# Patient Record
Sex: FEMALE | Race: WHITE | NOT HISPANIC OR LATINO
[De-identification: names, ages, dates, MRNs, and addresses within clinical notes are randomized per-mention and may not be internally consistent; named-entity substitution may affect disease eponyms.]

---

## 2017-04-11 ENCOUNTER — APPOINTMENT (OUTPATIENT)
Dept: RADIOLOGY | Facility: CLINIC | Age: 62
End: 2017-04-11

## 2017-04-11 ENCOUNTER — OUTPATIENT (OUTPATIENT)
Dept: OUTPATIENT SERVICES | Facility: HOSPITAL | Age: 62
LOS: 1 days | End: 2017-04-11
Payer: COMMERCIAL

## 2017-04-11 ENCOUNTER — APPOINTMENT (OUTPATIENT)
Dept: MRI IMAGING | Facility: CLINIC | Age: 62
End: 2017-04-11

## 2017-04-11 DIAGNOSIS — Z00.8 ENCOUNTER FOR OTHER GENERAL EXAMINATION: ICD-10-CM

## 2017-04-11 PROCEDURE — 20605 DRAIN/INJ JOINT/BURSA W/O US: CPT

## 2017-04-11 PROCEDURE — 23350 INJECTION FOR SHOULDER X-RAY: CPT

## 2017-04-11 PROCEDURE — 73222 MRI JOINT UPR EXTREM W/DYE: CPT

## 2017-04-11 PROCEDURE — 77002 NEEDLE LOCALIZATION BY XRAY: CPT

## 2017-08-16 ENCOUNTER — APPOINTMENT (OUTPATIENT)
Dept: PULMONOLOGY | Facility: CLINIC | Age: 62
End: 2017-08-16
Payer: COMMERCIAL

## 2017-08-16 ENCOUNTER — FORM ENCOUNTER (OUTPATIENT)
Age: 62
End: 2017-08-16

## 2017-08-16 VITALS
BODY MASS INDEX: 26.63 KG/M2 | WEIGHT: 156 LBS | DIASTOLIC BLOOD PRESSURE: 70 MMHG | HEART RATE: 102 BPM | SYSTOLIC BLOOD PRESSURE: 110 MMHG | OXYGEN SATURATION: 97 % | HEIGHT: 64 IN

## 2017-08-16 VITALS — WEIGHT: 156 LBS | BODY MASS INDEX: 26.78 KG/M2

## 2017-08-16 PROCEDURE — 99215 OFFICE O/P EST HI 40 MIN: CPT | Mod: 25

## 2017-08-16 PROCEDURE — 94060 EVALUATION OF WHEEZING: CPT

## 2017-08-16 PROCEDURE — 94664 DEMO&/EVAL PT USE INHALER: CPT | Mod: 59

## 2017-08-16 RX ORDER — FLUTICASONE FUROATE AND VILANTEROL TRIFENATATE 100; 25 UG/1; UG/1
100-25 POWDER RESPIRATORY (INHALATION) DAILY
Qty: 1 | Refills: 5 | Status: DISCONTINUED | COMMUNITY
Start: 2017-08-16 | End: 2017-08-16

## 2017-08-17 ENCOUNTER — APPOINTMENT (OUTPATIENT)
Dept: CT IMAGING | Facility: CLINIC | Age: 62
End: 2017-08-17
Payer: COMMERCIAL

## 2017-08-17 ENCOUNTER — MEDICATION RENEWAL (OUTPATIENT)
Age: 62
End: 2017-08-17

## 2017-08-17 ENCOUNTER — OUTPATIENT (OUTPATIENT)
Dept: OUTPATIENT SERVICES | Facility: HOSPITAL | Age: 62
LOS: 1 days | End: 2017-08-17
Payer: COMMERCIAL

## 2017-08-17 DIAGNOSIS — J84.9 INTERSTITIAL PULMONARY DISEASE, UNSPECIFIED: ICD-10-CM

## 2017-08-17 DIAGNOSIS — R91.8 OTHER NONSPECIFIC ABNORMAL FINDING OF LUNG FIELD: ICD-10-CM

## 2017-08-17 PROCEDURE — 71250 CT THORAX DX C-: CPT

## 2017-08-17 PROCEDURE — 71250 CT THORAX DX C-: CPT | Mod: 26

## 2017-09-27 ENCOUNTER — APPOINTMENT (OUTPATIENT)
Dept: PULMONOLOGY | Facility: CLINIC | Age: 62
End: 2017-09-27
Payer: COMMERCIAL

## 2017-09-27 VITALS — DIASTOLIC BLOOD PRESSURE: 72 MMHG | SYSTOLIC BLOOD PRESSURE: 118 MMHG | OXYGEN SATURATION: 98 % | HEART RATE: 78 BPM

## 2017-09-27 DIAGNOSIS — Z87.891 PERSONAL HISTORY OF NICOTINE DEPENDENCE: ICD-10-CM

## 2017-09-27 PROCEDURE — 94010 BREATHING CAPACITY TEST: CPT

## 2017-09-27 PROCEDURE — 99215 OFFICE O/P EST HI 40 MIN: CPT | Mod: 25

## 2017-09-27 RX ORDER — PREDNISONE 10 MG/1
10 TABLET ORAL
Qty: 42 | Refills: 0 | Status: DISCONTINUED | COMMUNITY
Start: 2017-08-16 | End: 2017-09-27

## 2017-09-27 RX ORDER — DOXYCYCLINE HYCLATE 100 MG/1
100 TABLET ORAL TWICE DAILY
Qty: 20 | Refills: 0 | Status: DISCONTINUED | COMMUNITY
Start: 2017-08-16 | End: 2017-09-27

## 2017-11-23 ENCOUNTER — FORM ENCOUNTER (OUTPATIENT)
Age: 62
End: 2017-11-23

## 2017-11-24 ENCOUNTER — APPOINTMENT (OUTPATIENT)
Dept: CT IMAGING | Facility: CLINIC | Age: 62
End: 2017-11-24
Payer: COMMERCIAL

## 2017-11-24 ENCOUNTER — OUTPATIENT (OUTPATIENT)
Dept: OUTPATIENT SERVICES | Facility: HOSPITAL | Age: 62
LOS: 1 days | End: 2017-11-24
Payer: COMMERCIAL

## 2017-11-24 DIAGNOSIS — R93.8 ABNORMAL FINDINGS ON DIAGNOSTIC IMAGING OF OTHER SPECIFIED BODY STRUCTURES: ICD-10-CM

## 2017-11-24 PROCEDURE — 71250 CT THORAX DX C-: CPT

## 2017-11-24 PROCEDURE — 71250 CT THORAX DX C-: CPT | Mod: 26

## 2019-03-20 ENCOUNTER — TRANSCRIPTION ENCOUNTER (OUTPATIENT)
Age: 64
End: 2019-03-20

## 2020-04-29 ENCOUNTER — APPOINTMENT (OUTPATIENT)
Dept: CT IMAGING | Facility: CLINIC | Age: 65
End: 2020-04-29
Payer: COMMERCIAL

## 2020-04-29 ENCOUNTER — OUTPATIENT (OUTPATIENT)
Dept: OUTPATIENT SERVICES | Facility: HOSPITAL | Age: 65
LOS: 1 days | End: 2020-04-29
Payer: COMMERCIAL

## 2020-04-29 DIAGNOSIS — J44.9 CHRONIC OBSTRUCTIVE PULMONARY DISEASE, UNSPECIFIED: ICD-10-CM

## 2020-04-29 DIAGNOSIS — Z00.00 ENCOUNTER FOR GENERAL ADULT MEDICAL EXAMINATION WITHOUT ABNORMAL FINDINGS: ICD-10-CM

## 2020-04-29 PROCEDURE — 71250 CT THORAX DX C-: CPT | Mod: 26

## 2020-04-29 PROCEDURE — 71250 CT THORAX DX C-: CPT

## 2020-05-05 ENCOUNTER — APPOINTMENT (OUTPATIENT)
Dept: PULMONOLOGY | Facility: CLINIC | Age: 65
End: 2020-05-05
Payer: COMMERCIAL

## 2020-05-05 VITALS — WEIGHT: 150 LBS | DIASTOLIC BLOOD PRESSURE: 75 MMHG | SYSTOLIC BLOOD PRESSURE: 140 MMHG | BODY MASS INDEX: 25.75 KG/M2

## 2020-05-05 VITALS — OXYGEN SATURATION: 94 % | HEART RATE: 105 BPM

## 2020-05-05 DIAGNOSIS — J18.9 PNEUMONIA, UNSPECIFIED ORGANISM: ICD-10-CM

## 2020-05-05 DIAGNOSIS — J84.9 INTERSTITIAL PULMONARY DISEASE, UNSPECIFIED: ICD-10-CM

## 2020-05-05 PROCEDURE — 99215 OFFICE O/P EST HI 40 MIN: CPT

## 2020-05-05 RX ORDER — BUDESONIDE AND FORMOTEROL FUMARATE DIHYDRATE 160; 4.5 UG/1; UG/1
160-4.5 AEROSOL RESPIRATORY (INHALATION) TWICE DAILY
Qty: 3 | Refills: 3 | Status: DISCONTINUED | COMMUNITY
Start: 2017-08-17 | End: 2020-05-05

## 2020-05-05 NOTE — CONSULT LETTER
[Dear  ___] : Dear  [unfilled], [Please see my note below.] : Please see my note below. [Consult Letter:] : I had the pleasure of evaluating your patient, [unfilled]. [Sincerely,] : Sincerely, [FreeTextEntry3] : Les Schumacher MD FCCP\par Pulmonary/Critical Care/Sleep Medicine\par Department of Internal Medicine\par \par [facility]\par

## 2020-05-05 NOTE — HISTORY OF PRESENT ILLNESS
[TextBox_4] : 65-year-old female with a history of stage II chronic obstructive lung disease, poorly compliant with her drug regimen, seen today for a three-week illness, which included generalized weakness, as well as mild shortness of breath without associated cough, wheezes sputum production. She denies any fevers, chills, chest pains. She does suffer from chronic claudication and has had several episodes of ankle edema with spontaneous resolution. She followup with you and reports a negative EKG and unchanged  Spirometry, and was able to show me on her phone. Normal CBC and CMP.\par \par There has been some improvement in symptoms

## 2020-05-05 NOTE — PHYSICAL EXAM
[No Acute Distress] : no acute distress [Normal Oropharynx] : normal oropharynx [No Neck Mass] : no neck mass [Normal Appearance] : normal appearance [Normal Rate/Rhythm] : normal rate/rhythm [No Murmurs] : no murmurs [No Resp Distress] : no resp distress [Normal S1, S2] : normal s1, s2 [Clear to Auscultation Bilaterally] : clear to auscultation bilaterally [Benign] : benign [No Abnormalities] : no abnormalities [No Clubbing] : no clubbing [Normal Gait] : normal gait [No Cyanosis] : no cyanosis [No Edema] : no edema [FROM] : FROM [Normal Color/ Pigmentation] : normal color/ pigmentation [No Focal Deficits] : no focal deficits [Normal Affect] : normal affect [Oriented x3] : oriented x3

## 2020-05-05 NOTE — REASON FOR VISIT
[Follow-Up] : a follow-up visit [COPD] : COPD [Abnormal CXR/ Chest CT] : an abnormal CXR/ chest CT [Shortness of Breath] : shortness of breath

## 2020-05-05 NOTE — DISCUSSION/SUMMARY
[FreeTextEntry1] : 65-year-old female, seen today for the above. Patient's findings on CAT scan. Continue to demonstrate mild interstitial lung disease with a new left upper lobe infiltrate/mass. This may have been consistent with a community-acquired pneumonia treated by you but underlying malignancy cannot be ruled out. A followup CAT scan will be performed in 6 weeks. I have adjusted her pulmonary medications to improve her compliance. In addition, I recommended that she followup with cardiology in light of generalized weakness and mitral regurgitation.

## 2020-06-10 ENCOUNTER — APPOINTMENT (OUTPATIENT)
Dept: CT IMAGING | Facility: CLINIC | Age: 65
End: 2020-06-10
Payer: COMMERCIAL

## 2020-06-10 ENCOUNTER — RESULT REVIEW (OUTPATIENT)
Age: 65
End: 2020-06-10

## 2020-06-10 ENCOUNTER — OUTPATIENT (OUTPATIENT)
Dept: OUTPATIENT SERVICES | Facility: HOSPITAL | Age: 65
LOS: 1 days | End: 2020-06-10
Payer: COMMERCIAL

## 2020-06-10 DIAGNOSIS — Z00.8 ENCOUNTER FOR OTHER GENERAL EXAMINATION: ICD-10-CM

## 2020-06-10 DIAGNOSIS — R93.89 ABNORMAL FINDINGS ON DIAGNOSTIC IMAGING OF OTHER SPECIFIED BODY STRUCTURES: ICD-10-CM

## 2020-06-10 PROCEDURE — 71250 CT THORAX DX C-: CPT | Mod: 26

## 2020-06-10 PROCEDURE — 71250 CT THORAX DX C-: CPT

## 2020-06-16 ENCOUNTER — APPOINTMENT (OUTPATIENT)
Dept: NUCLEAR MEDICINE | Facility: CLINIC | Age: 65
End: 2020-06-16
Payer: COMMERCIAL

## 2020-06-16 ENCOUNTER — OUTPATIENT (OUTPATIENT)
Dept: OUTPATIENT SERVICES | Facility: HOSPITAL | Age: 65
LOS: 1 days | End: 2020-06-16

## 2020-06-16 DIAGNOSIS — R91.8 OTHER NONSPECIFIC ABNORMAL FINDING OF LUNG FIELD: ICD-10-CM

## 2020-06-16 PROCEDURE — 78815 PET IMAGE W/CT SKULL-THIGH: CPT | Mod: 26,PI

## 2020-06-24 ENCOUNTER — APPOINTMENT (OUTPATIENT)
Dept: PULMONOLOGY | Facility: CLINIC | Age: 65
End: 2020-06-24
Payer: COMMERCIAL

## 2020-06-24 VITALS
DIASTOLIC BLOOD PRESSURE: 70 MMHG | BODY MASS INDEX: 26.4 KG/M2 | OXYGEN SATURATION: 95 % | WEIGHT: 149 LBS | HEART RATE: 101 BPM | HEIGHT: 63 IN | SYSTOLIC BLOOD PRESSURE: 118 MMHG

## 2020-06-24 DIAGNOSIS — R93.89 ABNORMAL FINDINGS ON DIAGNOSTIC IMAGING OF OTHER SPECIFIED BODY STRUCTURES: ICD-10-CM

## 2020-06-24 PROCEDURE — 99215 OFFICE O/P EST HI 40 MIN: CPT

## 2020-06-24 RX ORDER — PREDNISONE 10 MG/1
10 TABLET ORAL
Qty: 42 | Refills: 0 | Status: DISCONTINUED | COMMUNITY
Start: 2020-05-05 | End: 2020-06-24

## 2020-06-24 NOTE — DISCUSSION/SUMMARY
[FreeTextEntry1] : 65-year-old female with a history of stage II chronic obstructive lung disease seen today in followup. Patient wants to keep his well-controlled on her current drug regimen. Unfortunately, the left upper lobe nodule appears to be at positive system with a localized non-small cell carcinoma of the lung. Patient is therefore being referred directly to thoracic surgery for excisional biopsy.

## 2020-06-24 NOTE — PHYSICAL EXAM
[No Acute Distress] : no acute distress [Normal Oropharynx] : normal oropharynx [Normal Appearance] : normal appearance [No Neck Mass] : no neck mass [Normal Rate/Rhythm] : normal rate/rhythm [Normal S1, S2] : normal s1, s2 [No Resp Distress] : no resp distress [No Murmurs] : no murmurs [Benign] : benign [Clear to Auscultation Bilaterally] : clear to auscultation bilaterally [No Abnormalities] : no abnormalities [Normal Gait] : normal gait [No Edema] : no edema [No Cyanosis] : no cyanosis [No Clubbing] : no clubbing [No Focal Deficits] : no focal deficits [Normal Color/ Pigmentation] : normal color/ pigmentation [FROM] : FROM [Oriented x3] : oriented x3 [Normal Affect] : normal affect

## 2020-06-24 NOTE — CONSULT LETTER
[Consult Letter:] : I had the pleasure of evaluating your patient, [unfilled]. [Dear  ___] : Dear  [unfilled], [FreeTextEntry3] : Les Schumacher MD FCCP\par Pulmonary/Critical Care/Sleep Medicine\par Department of Internal Medicine\par \par [facility] [employer]\par  [Please see my note below.] : Please see my note below. [Sincerely,] : Sincerely,

## 2020-06-24 NOTE — HISTORY OF PRESENT ILLNESS
[TextBox_4] : 65-year-old female with a history of stage II chronic stroke, lung disease, seen today in followup, as well as followup of her chest CAT scan. Patient denies any complaints of increased cough, wheeze, shortness of breath, chest pains, palpitations, lightheadedness, dizziness.

## 2020-07-06 ENCOUNTER — APPOINTMENT (OUTPATIENT)
Dept: THORACIC SURGERY | Facility: CLINIC | Age: 65
End: 2020-07-06
Payer: COMMERCIAL

## 2020-07-06 VITALS — HEIGHT: 64 IN | BODY MASS INDEX: 25.61 KG/M2 | WEIGHT: 150 LBS

## 2020-07-06 PROCEDURE — 99203 OFFICE O/P NEW LOW 30 MIN: CPT | Mod: 95

## 2020-07-06 NOTE — REASON FOR VISIT
[Home] : at home, [unfilled] , at the time of the visit. [Medical Office: ([facility])___] : at the medical office located in  [Verbal consent obtained from patient] : the patient, [unfilled]

## 2020-07-08 DIAGNOSIS — Z01.818 ENCOUNTER FOR OTHER PREPROCEDURAL EXAMINATION: ICD-10-CM

## 2020-07-08 NOTE — HISTORY OF PRESENT ILLNESS
[FreeTextEntry1] : I spoke with Rosanna today by telehealth for an initial visit. She has a history of a left upper lobe nodule it has grown somewhat adjacent to a nodule in his stay largely unchanged but are both PET positive and concerning for malignancy. She does describe some shortness of breath with exertion but functions well on a daily basis. She denies fever, chills, night sweats, weight loss or weight gain.\par

## 2020-07-08 NOTE — ASSESSMENT
[FreeTextEntry1] : Rosanna is a 65-year-old female with 2 nodules in the left upper lobe that are concerning for malignancy. She does have significant interstitial changes by CT as well and I have asked her to return for followup pulmonary function testing and obtain a transthoracic needle biopsy of the nodule. These will be arranged through my office in the near future.\par \par Thank you for allowing me to participate in the care of your patient.\par \par Jarrell Swain MD\par Department of Cardiovascular and Thoracic Surgery\par \par [facility] [employer] [employer] at [facility]\par

## 2020-07-10 ENCOUNTER — APPOINTMENT (OUTPATIENT)
Dept: DISASTER EMERGENCY | Facility: CLINIC | Age: 65
End: 2020-07-10

## 2020-07-13 ENCOUNTER — APPOINTMENT (OUTPATIENT)
Dept: PULMONOLOGY | Facility: CLINIC | Age: 65
End: 2020-07-13
Payer: COMMERCIAL

## 2020-07-13 VITALS — HEIGHT: 63 IN | BODY MASS INDEX: 26.58 KG/M2 | WEIGHT: 150 LBS

## 2020-07-13 DIAGNOSIS — J44.1 CHRONIC OBSTRUCTIVE PULMONARY DISEASE WITH (ACUTE) EXACERBATION: ICD-10-CM

## 2020-07-13 LAB — SARS-COV-2 N GENE NPH QL NAA+PROBE: NOT DETECTED

## 2020-07-13 PROCEDURE — 94010 BREATHING CAPACITY TEST: CPT

## 2020-07-13 PROCEDURE — 94729 DIFFUSING CAPACITY: CPT

## 2020-07-13 PROCEDURE — 94727 GAS DIL/WSHOT DETER LNG VOL: CPT

## 2020-07-13 PROCEDURE — 85018 HEMOGLOBIN: CPT | Mod: QW

## 2020-07-15 ENCOUNTER — OUTPATIENT (OUTPATIENT)
Dept: OUTPATIENT SERVICES | Facility: HOSPITAL | Age: 65
LOS: 1 days | End: 2020-07-15
Payer: COMMERCIAL

## 2020-07-15 VITALS
TEMPERATURE: 97 F | RESPIRATION RATE: 16 BRPM | WEIGHT: 153.22 LBS | DIASTOLIC BLOOD PRESSURE: 75 MMHG | HEIGHT: 63 IN | SYSTOLIC BLOOD PRESSURE: 124 MMHG | HEART RATE: 79 BPM

## 2020-07-15 DIAGNOSIS — Z01.818 ENCOUNTER FOR OTHER PREPROCEDURAL EXAMINATION: ICD-10-CM

## 2020-07-15 LAB
ALBUMIN SERPL ELPH-MCNC: 4.6 G/DL — SIGNIFICANT CHANGE UP (ref 3.3–5.2)
ALP SERPL-CCNC: 96 U/L — SIGNIFICANT CHANGE UP (ref 40–120)
ALT FLD-CCNC: 14 U/L — SIGNIFICANT CHANGE UP
ANION GAP SERPL CALC-SCNC: 13 MMOL/L — SIGNIFICANT CHANGE UP (ref 5–17)
APTT BLD: 32.6 SEC — SIGNIFICANT CHANGE UP (ref 27.5–35.5)
AST SERPL-CCNC: 24 U/L — SIGNIFICANT CHANGE UP
BILIRUB SERPL-MCNC: 0.6 MG/DL — SIGNIFICANT CHANGE UP (ref 0.4–2)
BUN SERPL-MCNC: 20 MG/DL — SIGNIFICANT CHANGE UP (ref 8–20)
CALCIUM SERPL-MCNC: 10.3 MG/DL — HIGH (ref 8.6–10.2)
CHLORIDE SERPL-SCNC: 98 MMOL/L — SIGNIFICANT CHANGE UP (ref 98–107)
CO2 SERPL-SCNC: 27 MMOL/L — SIGNIFICANT CHANGE UP (ref 22–29)
CREAT SERPL-MCNC: 0.59 MG/DL — SIGNIFICANT CHANGE UP (ref 0.5–1.3)
GLUCOSE SERPL-MCNC: 98 MG/DL — SIGNIFICANT CHANGE UP (ref 70–99)
HCT VFR BLD CALC: 41.8 % — SIGNIFICANT CHANGE UP (ref 34.5–45)
HGB BLD-MCNC: 13.9 G/DL — SIGNIFICANT CHANGE UP (ref 11.5–15.5)
INR BLD: 1.01 RATIO — SIGNIFICANT CHANGE UP (ref 0.88–1.16)
MCHC RBC-ENTMCNC: 32.6 PG — SIGNIFICANT CHANGE UP (ref 27–34)
MCHC RBC-ENTMCNC: 33.3 GM/DL — SIGNIFICANT CHANGE UP (ref 32–36)
MCV RBC AUTO: 97.9 FL — SIGNIFICANT CHANGE UP (ref 80–100)
PLATELET # BLD AUTO: 210 K/UL — SIGNIFICANT CHANGE UP (ref 150–400)
POTASSIUM SERPL-MCNC: 4.1 MMOL/L — SIGNIFICANT CHANGE UP (ref 3.5–5.3)
POTASSIUM SERPL-SCNC: 4.1 MMOL/L — SIGNIFICANT CHANGE UP (ref 3.5–5.3)
PROT SERPL-MCNC: 7.4 G/DL — SIGNIFICANT CHANGE UP (ref 6.6–8.7)
PROTHROM AB SERPL-ACNC: 11.7 SEC — SIGNIFICANT CHANGE UP (ref 10.6–13.6)
RBC # BLD: 4.27 M/UL — SIGNIFICANT CHANGE UP (ref 3.8–5.2)
RBC # FLD: 12.9 % — SIGNIFICANT CHANGE UP (ref 10.3–14.5)
SODIUM SERPL-SCNC: 138 MMOL/L — SIGNIFICANT CHANGE UP (ref 135–145)
WBC # BLD: 7.42 K/UL — SIGNIFICANT CHANGE UP (ref 3.8–10.5)
WBC # FLD AUTO: 7.42 K/UL — SIGNIFICANT CHANGE UP (ref 3.8–10.5)

## 2020-07-15 PROCEDURE — 85027 COMPLETE CBC AUTOMATED: CPT

## 2020-07-15 PROCEDURE — 85610 PROTHROMBIN TIME: CPT

## 2020-07-15 PROCEDURE — 85730 THROMBOPLASTIN TIME PARTIAL: CPT

## 2020-07-15 PROCEDURE — G0463: CPT

## 2020-07-15 PROCEDURE — 80053 COMPREHEN METABOLIC PANEL: CPT

## 2020-07-15 PROCEDURE — 93005 ELECTROCARDIOGRAM TRACING: CPT

## 2020-07-15 PROCEDURE — 36415 COLL VENOUS BLD VENIPUNCTURE: CPT

## 2020-07-15 PROCEDURE — 93010 ELECTROCARDIOGRAM REPORT: CPT

## 2020-07-15 RX ORDER — SODIUM CHLORIDE 9 MG/ML
3 INJECTION INTRAMUSCULAR; INTRAVENOUS; SUBCUTANEOUS ONCE
Refills: 0 | Status: DISCONTINUED | OUTPATIENT
Start: 2020-07-23 | End: 2020-08-07

## 2020-07-15 NOTE — H&P PST ADULT - HISTORY OF PRESENT ILLNESS
65 year old female with history of COPD and lung nodules. Pt states she was diagnosed with lung nodules years ago and was following up regularly until 2017. In April 2020 she was diagnosed with PNA and followed with her pulmonologist Dr. Moraes who ordered further testing. She had an abnormal PET scan and was then referred to Dr. Swain. She is planning to undergo CT guided lung biopsy on July 23, 2020.    The patient reports dyspnea on exertion which is not a new symptom although she does work for the [employer] and does a fair amount of walking for work on a regular basis.

## 2020-07-15 NOTE — H&P PST ADULT - EKG AND INTERPRETATION
Pending official review. Sinus rhythm at 73 bpm, poor R wave progression    Pending official review.

## 2020-07-15 NOTE — H&P PST ADULT - ASSESSMENT
Mrs. Horton is a pleasant, former smoking, 65 year old female with history of COPD, cervical CA s/p hysterectomy and multiple lung nodules who presents prior to planned CT guided lung biopsy on 2020 with Dr. Swain.     CAPRINI VTE 2.0 SCORE [CLOT updated 2019]    AGE RELATED RISK FACTORS                                                       MOBILITY RELATED FACTORS  [ ] Age 41-60 years                                            (1 Point)                    [ ] Bed rest                                                        (1 Point)  [x ] Age: 61-74 years                                           (2 Points)                  [ ] Plaster cast                                                   (2 Points)  [ ] Age= 75 years                                              (3 Points)                    [ ] Bed bound for more than 72 hours                 (2 Points)    DISEASE RELATED RISK FACTORS                                               GENDER SPECIFIC FACTORS  [ ] Edema in the lower extremities                       (1 Point)              [ ] Pregnancy                                                     (1 Point)  [x ] Varicose veins                                               (1 Point)                     [ ] Post-partum < 6 weeks                                   (1 Point)             [ ] BMI > 25 Kg/m2                                            (1 Point)                     [ ] Hormonal therapy  or oral contraception          (1 Point)                 [ ] Sepsis (in the previous month)                        (1 Point)               [ ] History of pregnancy complications                 (1 point)  [ ] Pneumonia or serious lung disease                                               [ ] Unexplained or recurrent                     (1 Point)           (in the previous month)                               (1 Point)  [ ] Abnormal pulmonary function test                     (1 Point)                 SURGERY RELATED RISK FACTORS  [ ] Acute myocardial infarction                              (1 Point)               [ ]  Section                                             (1 Point)  [ ] Congestive heart failure (in the previous month)  (1 Point)      [ ] Minor surgery                                                  (1 Point)   [ ] Inflammatory bowel disease                             (1 Point)               [ ] Arthroscopic surgery                                        (2 Points)  [ ] Central venous access                                      (2 Points)                [x ] General surgery lasting more than 45 minutes (2 points)  [ x] Malignancy- Present or previous                   (2 Points)                [ ] Elective arthroplasty                                         (5 points)    [ ] Stroke (in the previous month)                          (5 Points)                                                                                                                                                           HEMATOLOGY RELATED FACTORS                                                 TRAUMA RELATED RISK FACTORS  [ ] Prior episodes of VTE                                     (3 Points)                [ ] Fracture of the hip, pelvis, or leg                       (5 Points)  [ ] Positive family history for VTE                         (3 Points)             [ ] Acute spinal cord injury (in the previous month)  (5 Points)  [ ] Prothrombin 76765 A                                     (3 Points)               [ ] Paralysis  (less than 1 month)                             (5 Points)  [ ] Factor V Leiden                                             (3 Points)                  [ ] Multiple Trauma within 1 month                        (5 Points)  [ ] Lupus anticoagulants                                     (3 Points)                                                           [ ] Anticardiolipin antibodies                               (3 Points)                                                       [ ] High homocysteine in the blood                      (3 Points)                                             [ ] Other congenital or acquired thrombophilia      (3 Points)                                                [ ] Heparin induced thrombocytopenia                  (3 Points)                                     Total Score [   6     ]  OPIOID RISK TOOL    CAROLINE EACH BOX THAT APPLIES AND ADD TOTALS AT THE END    FAMILY HISTORY OF SUBSTANCE ABUSE                 FEMALE         MALE                                                Alcohol                             [  ]1 pt          [  ]3pts                                               Illegal Drugs                     [  ]2 pts        [  ]3pts                                               Rx Drugs                           [  ]4 pts        [  ]4 pts    PERSONAL HISTORY OF SUBSTANCE ABUSE                                                                                          Alcohol                             [  ]3 pts       [  ]3 pts                                               Illegal Drugs                     [  ]4 pts        [  ]4 pts                                               Rx Drugs                           [  ]5 pts        [  ]5 pts    AGE BETWEEN 16-45 YEARS                                      [  ]1 pt         [  ]1 pt    HISTORY OF PREADOLESCENT   SEXUAL ABUSE                                                             [  ]3 pts        [  ]0pts    PSYCHOLOGICAL DISEASE                     ADD, OCD, Bipolar, Schizophrenia        [  ]2 pts         [  ]2 pts                      Depression                                               [ x ]1 pt           [  ]1 pt           SCORING TOTAL   (add numbers and type here)              ( 1 )                                     A score of 3 or lower indicated LOW risk for future opioid abuse  A score of 4 to 7 indicated moderate risk for future opioid abuse  A score of 8 or higher indicates a high risk for opioid abuse

## 2020-07-15 NOTE — H&P PST ADULT - NSICDXPASTMEDICALHX_GEN_ALL_CORE_FT
PAST MEDICAL HISTORY:  Cervical cancer s/p hysterectomy 2005    COPD without exacerbation no recent hospitalizations    Lung nodule multiple nodules pending biopsy    Pneumonia May 2020 treated outpatient    Shoulder pain with history of repair of rotator cuff 2015

## 2020-07-15 NOTE — H&P PST ADULT - NSICDXPROBLEM_GEN_ALL_CORE_FT
PROBLEM DIAGNOSES  Problem: Lung nodule  Assessment and Plan: CT guided Lung biopsy planned for 7/23/20, pending medical clearance    Problem: COPD without exacerbation  Assessment and Plan:     Problem: Need for prophylactic measure  Assessment and Plan: Caprini Score 6    Problem: Anxiety  Assessment and Plan: controlled with medication

## 2020-07-15 NOTE — H&P PST ADULT - NSICDXPASTSURGICALHX_GEN_ALL_CORE_FT
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

## 2020-07-19 DIAGNOSIS — Z01.818 ENCOUNTER FOR OTHER PREPROCEDURAL EXAMINATION: ICD-10-CM

## 2020-07-20 ENCOUNTER — APPOINTMENT (OUTPATIENT)
Dept: DISASTER EMERGENCY | Facility: CLINIC | Age: 65
End: 2020-07-20

## 2020-07-21 LAB — SARS-COV-2 N GENE NPH QL NAA+PROBE: NOT DETECTED

## 2020-07-23 ENCOUNTER — RESULT REVIEW (OUTPATIENT)
Age: 65
End: 2020-07-23

## 2020-07-23 ENCOUNTER — OUTPATIENT (OUTPATIENT)
Dept: OUTPATIENT SERVICES | Facility: HOSPITAL | Age: 65
LOS: 1 days | End: 2020-07-23
Payer: COMMERCIAL

## 2020-07-23 VITALS
TEMPERATURE: 97 F | OXYGEN SATURATION: 100 % | RESPIRATION RATE: 16 BRPM | DIASTOLIC BLOOD PRESSURE: 60 MMHG | HEART RATE: 70 BPM | SYSTOLIC BLOOD PRESSURE: 116 MMHG

## 2020-07-23 DIAGNOSIS — R06.00 DYSPNEA, UNSPECIFIED: ICD-10-CM

## 2020-07-23 DIAGNOSIS — Z29.9 ENCOUNTER FOR PROPHYLACTIC MEASURES, UNSPECIFIED: ICD-10-CM

## 2020-07-23 DIAGNOSIS — J44.9 CHRONIC OBSTRUCTIVE PULMONARY DISEASE, UNSPECIFIED: ICD-10-CM

## 2020-07-23 DIAGNOSIS — D16.9 BENIGN NEOPLASM OF BONE AND ARTICULAR CARTILAGE, UNSPECIFIED: Chronic | ICD-10-CM

## 2020-07-23 DIAGNOSIS — Z87.2 PERSONAL HISTORY OF DISEASES OF THE SKIN AND SUBCUTANEOUS TISSUE: Chronic | ICD-10-CM

## 2020-07-23 DIAGNOSIS — R91.1 SOLITARY PULMONARY NODULE: ICD-10-CM

## 2020-07-23 DIAGNOSIS — F41.9 ANXIETY DISORDER, UNSPECIFIED: ICD-10-CM

## 2020-07-23 DIAGNOSIS — Z90.710 ACQUIRED ABSENCE OF BOTH CERVIX AND UTERUS: Chronic | ICD-10-CM

## 2020-07-23 PROCEDURE — 88305 TISSUE EXAM BY PATHOLOGIST: CPT | Mod: 26

## 2020-07-23 PROCEDURE — 71045 X-RAY EXAM CHEST 1 VIEW: CPT

## 2020-07-23 PROCEDURE — 71045 X-RAY EXAM CHEST 1 VIEW: CPT | Mod: 26,76

## 2020-07-23 PROCEDURE — 88305 TISSUE EXAM BY PATHOLOGIST: CPT

## 2020-07-23 PROCEDURE — 77012 CT SCAN FOR NEEDLE BIOPSY: CPT | Mod: 26

## 2020-07-23 PROCEDURE — 32405: CPT

## 2020-07-23 PROCEDURE — 77012 CT SCAN FOR NEEDLE BIOPSY: CPT

## 2020-07-23 PROCEDURE — 32405: CPT | Mod: RT

## 2020-07-24 LAB — SURGICAL PATHOLOGY STUDY: SIGNIFICANT CHANGE UP

## 2020-07-27 PROBLEM — R91.1 SOLITARY PULMONARY NODULE: Chronic | Status: ACTIVE | Noted: 2020-07-15

## 2020-07-27 PROBLEM — C53.9 MALIGNANT NEOPLASM OF CERVIX UTERI, UNSPECIFIED: Chronic | Status: ACTIVE | Noted: 2020-07-15

## 2020-07-27 PROBLEM — J44.9 CHRONIC OBSTRUCTIVE PULMONARY DISEASE, UNSPECIFIED: Chronic | Status: ACTIVE | Noted: 2020-07-15

## 2020-07-27 PROBLEM — M25.519 PAIN IN UNSPECIFIED SHOULDER: Chronic | Status: ACTIVE | Noted: 2020-07-15

## 2020-07-27 PROBLEM — J18.9 PNEUMONIA, UNSPECIFIED ORGANISM: Chronic | Status: ACTIVE | Noted: 2020-07-15

## 2020-07-29 ENCOUNTER — APPOINTMENT (OUTPATIENT)
Dept: THORACIC SURGERY | Facility: CLINIC | Age: 65
End: 2020-07-29
Payer: COMMERCIAL

## 2020-07-29 VITALS
RESPIRATION RATE: 16 BRPM | DIASTOLIC BLOOD PRESSURE: 63 MMHG | OXYGEN SATURATION: 97 % | HEART RATE: 91 BPM | SYSTOLIC BLOOD PRESSURE: 100 MMHG

## 2020-07-29 PROCEDURE — 99214 OFFICE O/P EST MOD 30 MIN: CPT

## 2020-07-29 NOTE — ASSESSMENT
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 She does have significant interstitial changes by CT at a reduced diffusion capacity making her a higher risk for surgical removal. Based on the above I have asked her to obtain a radiation oncology consultation for a discussion regarding SBRT. Once that evaluation is complete a multidisciplinary discussion can be had prior to further recommendations.\par \[facility] Thank you for allowing me to participate in the care of your patient\par \par Jarrell Swain MD\[facility] Department of Cardiovascular and Thoracic Surgery\par \vernell [employer]raes and Shira [employer]\[facility] [employer] [facility] at [facility]

## 2020-07-29 NOTE — PHYSICAL EXAM
[] : no respiratory distress [Auscultation Breath Sounds / Voice Sounds] : lungs were clear to auscultation bilaterally [Heart Rate And Rhythm] : heart rate was normal and rhythm regular [Heart Sounds] : normal S1 and S2 [Murmurs] : no murmurs [Heart Sounds Gallop] : no gallops [Heart Sounds Pericardial Friction Rub] : no pericardial rub [Examination Of The Chest] : the chest was normal in appearance [Chest Visual Inspection Thoracic Asymmetry] : no chest asymmetry [Diminished Respiratory Excursion] : normal chest expansion [No Focal Deficits] : no focal deficits [Oriented To Time, Place, And Person] : oriented to person, place, and time [Impaired Insight] : insight and judgment were intact [Affect] : the affect was normal

## 2020-07-29 NOTE — HISTORY OF PRESENT ILLNESS
[FreeTextEntry1] : \vernell Marte was in the office today for a followup visit. She recently underwen pulmonary function testing followed by a transthoracic needle biopsy that demonstrated adenocarcinoma in the left upper lobe nodule. Her pulmonary function demonstrated a significant decrease in diffusion capacity.

## 2020-07-30 ENCOUNTER — OUTPATIENT (OUTPATIENT)
Dept: OUTPATIENT SERVICES | Facility: HOSPITAL | Age: 65
LOS: 1 days | Discharge: HOME | End: 2020-07-30

## 2020-07-30 DIAGNOSIS — D16.9 BENIGN NEOPLASM OF BONE AND ARTICULAR CARTILAGE, UNSPECIFIED: Chronic | ICD-10-CM

## 2020-07-30 DIAGNOSIS — Z90.710 ACQUIRED ABSENCE OF BOTH CERVIX AND UTERUS: Chronic | ICD-10-CM

## 2020-07-30 DIAGNOSIS — Z87.2 PERSONAL HISTORY OF DISEASES OF THE SKIN AND SUBCUTANEOUS TISSUE: Chronic | ICD-10-CM

## 2020-07-31 ENCOUNTER — APPOINTMENT (OUTPATIENT)
Dept: RADIATION ONCOLOGY | Facility: CLINIC | Age: 65
End: 2020-07-31
Payer: COMMERCIAL

## 2020-07-31 VITALS
DIASTOLIC BLOOD PRESSURE: 84 MMHG | HEART RATE: 88 BPM | BODY MASS INDEX: 25.87 KG/M2 | WEIGHT: 146 LBS | HEIGHT: 63 IN | SYSTOLIC BLOOD PRESSURE: 132 MMHG | OXYGEN SATURATION: 94 % | RESPIRATION RATE: 16 BRPM | TEMPERATURE: 98 F

## 2020-07-31 DIAGNOSIS — Z78.9 OTHER SPECIFIED HEALTH STATUS: ICD-10-CM

## 2020-07-31 PROCEDURE — 99205 OFFICE O/P NEW HI 60 MIN: CPT | Mod: 25

## 2020-07-31 NOTE — PHYSICAL EXAM
[Outer Ear] : the ears and nose were normal in appearance [Normal] : normal skin color and pigmentation and no rash

## 2020-07-31 NOTE — REVIEW OF SYSTEMS
[Cough] : cough [SOB on Exertion] : shortness of breath during exertion [Negative] : Allergic/Immunologic [Dysphagia: Grade 0] : Dysphagia: Grade 0 [Edema Limbs: Grade 0] : Edema Limbs: Grade 0  [Fatigue: Grade 0] : Fatigue: Grade 0 [Localized Edema: Grade 0] : Localized Edema: Grade 0  [Headache: Grade 0] : Headache: Grade 0 [Dyspnea: Grade 1 - Shortness of breath with moderate exertion] : Dyspnea: Grade 1 - Shortness of breath with moderate exertion [Cough: Grade 1 - Mild symptoms; nonprescription intervention indicated] : Cough: Grade 1 - Mild symptoms; nonprescription intervention indicated [Voice Alteration: Grade 1 - Mild or intermittent change from normal voice] : Voice Alteration: Grade 1 - Mild or intermittent change from normal voice

## 2020-07-31 NOTE — HISTORY OF PRESENT ILLNESS
[FreeTextEntry1] : 65 year old woman presents today for consultation regarding her newly diagnosed lung cancer.\par \par She has had a long-standing history of pulmonary nodules (last CT chest in 11/2017), and has follow closely with Dr. Schumacher for COPD.\par \par More recently, she presented with symptoms of pneumonia, and CT chest was performed on 4/29/20 showing a peripheral right upper lobe 2.4 CM cyst, a few small linear or nodular opacities within the left upper lobe, but also a 2.7 x 1.4 CM peripheral patchy opacity in the left upper lobe, abutting the pleural surface.  This was in the same location as a 0.7 CM nodular opacity seen in prior study of 11/24/17.\par \par Short interval follow up CT chest was performed on 6/10/20, again demonstrating a 2.9 x 1.6 CM left upper lobe opacity.  There was an additional 1.7 x 0.5 CM left upper lobe opacity adjacent to the major fissure.  The right upper lobe cyst opacity was unchanged.\par \par Follow-up PET/CT was performed and 6/16/20.  The primary left upper lobe pulmonary nodule measured 2.9 x 0.9 CM (SUV 11), and the separate ZOFIA opacity adjacent to the major fissure measured 1.7 x 0.5 CM (SUV 3.6).  There was mild FDG avidity associated with the RUL cyst (SUV 2.4).  There is no evidence of mediastinal or hilar adenopathy.  There was no evidence of distant metastatic disease.\par \par She met with Dr. Swain on 7/6/20.  She underwent transthoracic needle biopsy by Dr. Thompson on 7/23/2020, with pathology showing adenocarcinoma, acinar type. \par \par Currently, she reports dyspnea and cough on exertion, stable raspy voice.  Denies dyspnea or cough at rest, orthopnea, odynophagia, dysphagia, headaches, nausea, vision changes, pain, fatigue or unintentional weight loss.\par \par She has a history of cervical cancer, status post surgery in 2005; no adjuvant chemotherapy or radiation.

## 2020-07-31 NOTE — DISEASE MANAGEMENT
[Clinical] : TNM Stage: c [IA] : IA [TTNM] : 1c(m) vs 3 [FreeTextEntry4] : adenocarcinoma  [NTNM] : 0 [MTNM] : 0

## 2020-07-31 NOTE — LETTER CLOSING
[Consult Closing:] : Thank you for allowing me to participate in the care of this patient.  If you have any questions, please do not hesitate to contact me. [Sincerely yours,] : Sincerely yours, [FreeTextEntry3] : Jose Quijano MD\par Attending Physician\par Department of Radiation Medicine\par [facility], [facility]\par \par \par [facility] at [facility]\par

## 2020-07-31 NOTE — VITALS
[Least Pain Intensity: 0/10] : 0/10 [Maximal Pain Intensity: 0/10] : 0/10 [Date: ____________] : Patient's last distress assessment performed on [unfilled]. [80: Normal activity with effort; some signs or symptoms of disease.] : 80: Normal activity with effort; some signs or symptoms of disease.  [8 - Distress Level] : Distress Level: 8 [Patient given social work contact information and resource sheet] : Patient was given social work contact information and resource sheet

## 2020-08-03 ENCOUNTER — OUTPATIENT (OUTPATIENT)
Dept: OUTPATIENT SERVICES | Facility: HOSPITAL | Age: 65
LOS: 1 days | Discharge: HOME | End: 2020-08-03

## 2020-08-03 DIAGNOSIS — C34.90 MALIGNANT NEOPLASM OF UNSPECIFIED PART OF UNSPECIFIED BRONCHUS OR LUNG: ICD-10-CM

## 2020-08-03 DIAGNOSIS — Z90.710 ACQUIRED ABSENCE OF BOTH CERVIX AND UTERUS: Chronic | ICD-10-CM

## 2020-08-03 DIAGNOSIS — D16.9 BENIGN NEOPLASM OF BONE AND ARTICULAR CARTILAGE, UNSPECIFIED: Chronic | ICD-10-CM

## 2020-08-03 DIAGNOSIS — Z87.2 PERSONAL HISTORY OF DISEASES OF THE SKIN AND SUBCUTANEOUS TISSUE: Chronic | ICD-10-CM

## 2020-08-04 ENCOUNTER — APPOINTMENT (OUTPATIENT)
Dept: HEMATOLOGY ONCOLOGY | Facility: CLINIC | Age: 65
End: 2020-08-04
Payer: COMMERCIAL

## 2020-08-04 ENCOUNTER — RESULT REVIEW (OUTPATIENT)
Age: 65
End: 2020-08-04

## 2020-08-04 VITALS
DIASTOLIC BLOOD PRESSURE: 68 MMHG | SYSTOLIC BLOOD PRESSURE: 107 MMHG | HEART RATE: 102 BPM | BODY MASS INDEX: 27.29 KG/M2 | HEIGHT: 63 IN | WEIGHT: 154 LBS | OXYGEN SATURATION: 94 %

## 2020-08-04 LAB
ANISOCYTOSIS BLD QL: SLIGHT — SIGNIFICANT CHANGE UP
BASOPHILS # BLD AUTO: 0.1 K/UL — SIGNIFICANT CHANGE UP (ref 0–0.2)
EOSINOPHIL # BLD AUTO: 0.1 K/UL — SIGNIFICANT CHANGE UP (ref 0–0.5)
EOSINOPHIL NFR BLD AUTO: 1 % — SIGNIFICANT CHANGE UP (ref 0–6)
HCT VFR BLD CALC: 42.6 % — SIGNIFICANT CHANGE UP (ref 34.5–45)
HGB BLD-MCNC: 14.2 G/DL — SIGNIFICANT CHANGE UP (ref 11.5–15.5)
LYMPHOCYTES # BLD AUTO: 2.1 K/UL — SIGNIFICANT CHANGE UP (ref 1–3.3)
LYMPHOCYTES # BLD AUTO: 43 % — SIGNIFICANT CHANGE UP (ref 13–44)
MACROCYTES BLD QL: SLIGHT — SIGNIFICANT CHANGE UP
MCHC RBC-ENTMCNC: 31.8 PG — SIGNIFICANT CHANGE UP (ref 27–34)
MCHC RBC-ENTMCNC: 33.4 G/DL — SIGNIFICANT CHANGE UP (ref 32–36)
MCV RBC AUTO: 95.2 FL — SIGNIFICANT CHANGE UP (ref 80–100)
MICROCYTES BLD QL: SLIGHT — SIGNIFICANT CHANGE UP
MONOCYTES # BLD AUTO: 0.4 K/UL — SIGNIFICANT CHANGE UP (ref 0–0.9)
MONOCYTES NFR BLD AUTO: 7 % — SIGNIFICANT CHANGE UP (ref 2–14)
NEUTROPHILS # BLD AUTO: 2.3 K/UL — SIGNIFICANT CHANGE UP (ref 1.8–7.4)
NEUTROPHILS NFR BLD AUTO: 47 % — SIGNIFICANT CHANGE UP (ref 43–77)
PLAT MORPH BLD: NORMAL — SIGNIFICANT CHANGE UP
PLATELET # BLD AUTO: 219 K/UL — SIGNIFICANT CHANGE UP (ref 150–400)
POIKILOCYTOSIS BLD QL AUTO: SLIGHT — SIGNIFICANT CHANGE UP
RBC # BLD: 4.48 M/UL — SIGNIFICANT CHANGE UP (ref 3.8–5.2)
RBC # FLD: 11.7 % — SIGNIFICANT CHANGE UP (ref 10.3–14.5)
RBC BLD AUTO: SIGNIFICANT CHANGE UP
STOMATOCYTES BLD QL SMEAR: PRESENT — SIGNIFICANT CHANGE UP
VARIANT LYMPHS # BLD: 2 % — SIGNIFICANT CHANGE UP (ref 0–6)
WBC # BLD: 5 K/UL — SIGNIFICANT CHANGE UP (ref 3.8–10.5)
WBC # FLD AUTO: 5 K/UL — SIGNIFICANT CHANGE UP (ref 3.8–10.5)

## 2020-08-04 PROCEDURE — 99205 OFFICE O/P NEW HI 60 MIN: CPT

## 2020-08-04 NOTE — RESULTS/DATA
[FreeTextEntry1] : 7/23/20 Path:  lung bx - adenocarcinoma, acinar type.  \par ALK:   Negative for a rearrangement involving the ALK gene.  \par Failure of ROS1 probe. \par EGFR:   Negative. Only the wild-type EGFR sequence was detected.\par KRAS:   Negative. Only the wild-type KRAS sequence was detected\par BRAF:   Negative. Only the wild-type BRAF V600 sequence was detected\par PD-L1:  Canceled. This specimen has insufficient cells forevaluation\par \par 6/20/20 PET:  1. A hypermetabolic 2.9 cm left upper lobe lung nodule with SUV 11.0 is unchanged as compared to CT dated 4/29/2020, and increased in size as compared to CT dated 11/24/2017, compatible with a primary lung neoplasm.\par 2. FDG-avid left upper lobe opacity adjacent to major fissure, not significantly changed since 4/29/2020, and new since 2017, is concerning for a synchronous lung neoplasm.\par 3. Small, mildly FDG-avid opacity associated with a right upper lobe cyst, unchanged since 4/29/2020, is indeterminate.\par 4. Mildly FDG-avid bilateral peripheral reticular opacities, unchanged since 4/29/2020, are compatible with pulmonary fibrosis.\par 5. No scan evidence of distant disease.\par \par

## 2020-08-04 NOTE — PHYSICAL EXAM
[Outer Ear] : the ears and nose were normal in appearance [Restricted in physically strenuous activity but ambulatory and able to carry out work of a light or sedentary nature] : Status 1- Restricted in physically strenuous activity but ambulatory and able to carry out work of a light or sedentary nature, e.g., light house work, office work [Normal] : affect appropriate

## 2020-08-04 NOTE — VITALS
[Maximal Pain Intensity: 0/10] : 0/10 [Least Pain Intensity: 0/10] : 0/10 [80: Normal activity with effort; some signs or symptoms of disease.] : 80: Normal activity with effort; some signs or symptoms of disease.  [Date: ____________] : Patient's last distress assessment performed on [unfilled]. [Patient given social work contact information and resource sheet] : Patient was given social work contact information and resource sheet [8 - Distress Level] : Distress Level: 8

## 2020-08-04 NOTE — DISEASE MANAGEMENT
[Clinical] : TNM Stage: c [IA] : IA [FreeTextEntry4] : adenocarcinoma  [TTNM] : 1c(m) vs 3 [NTNM] : 0 [MTNM] : 0

## 2020-08-04 NOTE — LETTER CLOSING
[Consult Closing:] : Thank you for allowing me to participate in the care of this patient.  If you have any questions, please do not hesitate to contact me. [Sincerely yours,] : Sincerely yours, [FreeTextEntry3] : Jose Quijano MD\par Attending Physician\par Department of Radiation Medicine\par [facility], [facility]\par \par \par [facility] at [facility]\par

## 2020-08-04 NOTE — OB/GYN HISTORY
[History of Birth Control Pills] : Patient has a history of taking birth control pills [Currently In Menopause] : currently in menopause [Menopause Age: ____] : patient was [unfilled] years old at menopause [___] : Living: [unfilled] [Experiencing Menopausal Sxs] : not experiencing menopausal symptoms [History of Hormone Replacement Therapy] : no history of hormone replacement therapy

## 2020-08-04 NOTE — REVIEW OF SYSTEMS
[Cough] : cough [SOB on Exertion] : shortness of breath during exertion [Negative] : Heme/Lymph [FreeTextEntry6] : cough and shortness of breath during exertion. \par cough and shortness of breath during exertion. \par cough and shortness of breath during exertion.

## 2020-08-06 ENCOUNTER — RESULT REVIEW (OUTPATIENT)
Age: 65
End: 2020-08-06

## 2020-08-12 LAB
ALBUMIN SERPL ELPH-MCNC: 4.6 G/DL
ALP BLD-CCNC: 118 U/L
ALT SERPL-CCNC: 11 U/L
ANION GAP SERPL CALC-SCNC: 12 MMOL/L
APTT BLD: 32.1 SEC
APTT IMM NP/PRE NP PPP: NORMAL
APTT INV RATIO PPP: 31.5 SEC
AST SERPL-CCNC: 18 U/L
BILIRUB SERPL-MCNC: 0.3 MG/DL
BUN SERPL-MCNC: 24 MG/DL
CALCIUM SERPL-MCNC: 10.2 MG/DL
CHLORIDE SERPL-SCNC: 103 MMOL/L
CO2 SERPL-SCNC: 28 MMOL/L
CREAT SERPL-MCNC: 0.86 MG/DL
GLUCOSE SERPL-MCNC: 97 MG/DL
HBV CORE IGG+IGM SER QL: NONREACTIVE
HBV SURFACE AB SER QL: NONREACTIVE
HBV SURFACE AG SER QL: NONREACTIVE
HCV AB SER QL: NONREACTIVE
HCV S/CO RATIO: 0.15 S/CO
INR PPP: 0.88 RATIO
MAGNESIUM SERPL-MCNC: 2 MG/DL
NPP NORMAL POOLED PLASMA: NORMAL SECS
POTASSIUM SERPL-SCNC: 4.6 MMOL/L
PROT SERPL-MCNC: 6.9 G/DL
PT BLD: 10.4 SEC
SODIUM SERPL-SCNC: 142 MMOL/L

## 2020-08-23 ENCOUNTER — TRANSCRIPTION ENCOUNTER (OUTPATIENT)
Age: 65
End: 2020-08-23

## 2020-08-25 ENCOUNTER — APPOINTMENT (OUTPATIENT)
Dept: ORTHOPEDIC SURGERY | Facility: CLINIC | Age: 65
End: 2020-08-25
Payer: COMMERCIAL

## 2020-08-25 VITALS — TEMPERATURE: 96.4 F

## 2020-08-25 VITALS
WEIGHT: 154 LBS | DIASTOLIC BLOOD PRESSURE: 74 MMHG | BODY MASS INDEX: 27.29 KG/M2 | HEIGHT: 63 IN | SYSTOLIC BLOOD PRESSURE: 117 MMHG | HEART RATE: 101 BPM

## 2020-08-25 DIAGNOSIS — S92.002A UNSPECIFIED FRACTURE OF LEFT CALCANEUS, INITIAL ENCOUNTER FOR CLOSED FRACTURE: ICD-10-CM

## 2020-08-25 PROCEDURE — 99204 OFFICE O/P NEW MOD 45 MIN: CPT | Mod: 57

## 2020-08-25 PROCEDURE — 28400 CLTX CALCANEAL FX W/O MNPJ: CPT | Mod: LT

## 2020-08-25 NOTE — DISCUSSION/SUMMARY
[de-identified] : 65-year-old female with left calcaneus fracture.  We discussed that these fractures can likely be treated nonoperatively especially given her underlying diagnosis of lung cancer.  Typically we would keep her nonweightbearing, but given her need for rehab post lobectomy, we will allow her to weight-bear in the cam boot in a protected fashion.  She is allowed to weight-bear on that foot but pain may limit her activities.  We will get repeat x-rays in a month which she can have done remotely given her likely slightly immunocompromised status post lobectomy and we can review the films in the office and perform a telehealth visit.  If she has any other concerns, she may follow-up earlier or at any time she may call if she has questions.  I have no orthopedic restrictions for her upcoming surgery.  The patient was given the opportunity to ask questions and all questions were answered to their satisfaction.\par \par The question of when to drive is impossible to generalize to everyone because it is largely dependent on the individual.  Importantly, doctors do not have a license with the DMV to "clear you" or "release you" to return to driving.  There are 3 primary criteria that must be met.  You need to be off of narcotic pain medicines (otherwise you are driving under the influence).  You need to be able to get in and out of the 's seat comfortably.  And you must have regained your normal reflexes / strength.  Also, return to driving depends partly on what side had surgery (ie. Right leg operates the pedals; people with Left side surgery can generally get back to driving much sooner unless you have a clutch).  The average time to return to driving is around 2 weeks after you return to normal walking for the right side and usually sooner for the left.  We recommend 'testing' yourself with another licensed  in an empty parking lot or quiet street first in order to check your reflexes moving your foot from pedal to pedal.\par \par Saúl Cesar MD\par Orthopaedic Trauma Surgeon\par [facility] [place]\par Director Orthopaedic Trauma, [facility]\par \par \par \par \par

## 2020-08-25 NOTE — PHYSICAL EXAM
[de-identified] : Physical Exam:\par General: Well appearing, no acute distress, A&O\par Neurologic: A&Ox3, No focal deficits\par Head: NCAT without abrasions, lacerations, or ecchymosis to head, face, or scalp\par Respiratory: Equal chest wall expansion bilaterally, no accessory muscle use\par Lymphatic: No lymphadenopathy palpated\par Skin: Warm and dry\par Psychiatric: Normal mood and affect\par \par LLE:\par Positive swelling, positive tenderness to palpation left foot\par SILT s/s/sp/dp/t\par Fires EHL/FHL/GS/TA\par 2+ DP/PT pulse\par brisk capillary refill [de-identified] : Plain films from the urgent care were reviewed.  There is a depressed posterior facet calcaneus fracture.  The alignment overall is reasonable.

## 2020-08-25 NOTE — HISTORY OF PRESENT ILLNESS
[de-identified] : The patient is a pleasant 65-year-old female who presents today for evaluation of left foot pain.  She states she was with her family when her grandbaby fell out of a stroller.  She attempted to catch the baby and she twisted her foot awkwardly.  She noted immediate pain.  She tried to treated with conservative care for 24 hours but eventually went to urgent care.  A calcaneus fracture was diagnosed.  She has been nonweightbearing.  Of note she is scheduled for a lobectomy for adenocarcinoma of the lung on September 4 at [facility] in [place].  She is very concerned if this will impair that surgery for her recovery.  The patient states the pain is made worse with activity and relieved with rest. aching, 4/10 [Bending] : worsened by bending [Lifting] : worsened by lifting [Recumbency] : relieved by recumbency [Rest] : relieved by rest

## 2020-08-26 ENCOUNTER — APPOINTMENT (OUTPATIENT)
Dept: HEMATOLOGY ONCOLOGY | Facility: CLINIC | Age: 65
End: 2020-08-26
Payer: COMMERCIAL

## 2020-08-26 PROCEDURE — 99205 OFFICE O/P NEW HI 60 MIN: CPT | Mod: 95

## 2020-08-26 PROCEDURE — 99215 OFFICE O/P EST HI 40 MIN: CPT | Mod: 95

## 2020-08-26 RX ORDER — ALPRAZOLAM 0.5 MG/1
0.5 TABLET ORAL
Qty: 30 | Refills: 0 | Status: ACTIVE | COMMUNITY
Start: 2020-08-04 | End: 1900-01-01

## 2020-08-26 NOTE — VITALS
[Least Pain Intensity: 0/10] : 0/10 [Maximal Pain Intensity: 0/10] : 0/10 [80: Normal activity with effort; some signs or symptoms of disease.] : 80: Normal activity with effort; some signs or symptoms of disease.  [Date: ____________] : Patient's last distress assessment performed on [unfilled]. [8 - Distress Level] : Distress Level: 8 [Patient given social work contact information and resource sheet] : Patient was given social work contact information and resource sheet

## 2020-08-26 NOTE — HISTORY OF PRESENT ILLNESS
[M: ___] : M[unfilled] [N: ___] : N[unfilled] [T: ___] : T[unfilled] [AJCC Stage: ____] : AJCC Stage: [unfilled] [de-identified] : The patient was diagnosed with NSCLC at the age of 65 in July 2020.  She has had a long-standing history of pulmonary nodules (last CT chest in 11/2017), and has followed closely with Dr. Schumacher for COPD.  More recently, she presented with symptoms of pneumonia, and a CT chest 4/29/20 showed a peripheral right upper lobe 2.4 cm cyst, a few small linear or nodular opacities within the left upper lobe and a 2.7 x 1.4 cm peripheral patchy opacity in the left upper lobe, abutting the pleural surface. This was in the same location as a 0.7cm nodular opacity seen in prior study of 11/24/17.  CT chest 6/10/20 again demonstrated a 2.9 x 1.6 cm left upper lobe opacity. There was an additional 1.7 x 0.5cm left upper lobe opacity adjacent to the major fissure. The right upper lobe cyst opacity was unchanged.  PET/CT 6/16/20 showed the primary left upper lobe pulmonary nodule measured 2.9 x 0.9cm (SUV 11), and the separate ZOFIA opacity adjacent to the major fissure measured 1.7 x 0.5cm (SUV 3.6). There was mild FDG avidity associated with the RUL cyst (SUV 2.4). There was no evidence of mediastinal or hilar adenopathy or distant metastatic disease.  She met with Dr. Swain on 7/6/20 and is s/p transthoracic needle biopsy 7/23/2020, with pathology c/w adenocarcinoma, acinar type. \par \par \par \par  \par  [de-identified] : cT1c(m) vs 3 N 0 M 0] [de-identified] : She presents today for initial evaluation.  Currently, she reports dyspnea and cough on exertion, stable raspy voice. Denies dyspnea or cough at rest, orthopnea, odynophagia, dysphagia, headaches, nausea, vision changes, pain, fatigue or unintentional weight loss. [de-identified] : \par She has a history of cervical cancer, status post surgery in 2005; no adjuvant chemotherapy or radiation.  [FreeTextEntry1] : 65 year old woman presents today for consultation regarding her newly diagnosed lung cancer.\par \par She has had a long-standing history of pulmonary nodules (last CT chest in 11/2017), and has follow closely with Dr. Schumacher for COPD.\par \par More recently, she presented with symptoms of pneumonia, and CT chest was performed on 4/29/20 showing a peripheral right upper lobe 2.4 CM cyst, a few small linear or nodular opacities within the left upper lobe, but also a 2.7 x 1.4 CM peripheral patchy opacity in the left upper lobe, abutting the pleural surface.  This was in the same location as a 0.7 CM nodular opacity seen in prior study of 11/24/17.\par \par Short interval follow up CT chest was performed on 6/10/20, again demonstrating a 2.9 x 1.6 CM left upper lobe opacity.  There was an additional 1.7 x 0.5 CM left upper lobe opacity adjacent to the major fissure.  The right upper lobe cyst opacity was unchanged.\par \par Follow-up PET/CT was performed and 6/16/20.  The primary left upper lobe pulmonary nodule measured 2.9 x 0.9 CM (SUV 11), and the separate ZOFIA opacity adjacent to the major fissure measured 1.7 x 0.5 CM (SUV 3.6).  There was mild FDG avidity associated with the RUL cyst (SUV 2.4).  There is no evidence of mediastinal or hilar adenopathy.  There was no evidence of distant metastatic disease.\par \par She met with Dr. Swain on 7/6/20.  She underwent transthoracic needle biopsy by Dr. Thompson on 7/23/2020, with pathology showing adenocarcinoma, acinar type. \par \par Currently, she reports dyspnea and cough on exertion, stable raspy voice.  Denies dyspnea or cough at rest, orthopnea, odynophagia, dysphagia, headaches, nausea, vision changes, pain, fatigue or unintentional weight loss.\par \par She has a history of cervical cancer, status post surgery in 2005; no adjuvant chemotherapy or radiation.

## 2020-08-26 NOTE — PHYSICAL EXAM
[Restricted in physically strenuous activity but ambulatory and able to carry out work of a light or sedentary nature] : Status 1- Restricted in physically strenuous activity but ambulatory and able to carry out work of a light or sedentary nature, e.g., light house work, office work [Outer Ear] : the ears and nose were normal in appearance [Normal] : oriented to person, place and time, the affect was normal, the mood was normal and not anxious

## 2020-08-26 NOTE — LETTER CLOSING
[Sincerely yours,] : Sincerely yours, [Consult Closing:] : Thank you for allowing me to participate in the care of this patient.  If you have any questions, please do not hesitate to contact me. [FreeTextEntry3] : Jose Quijano MD\par Attending Physician\par Department of Radiation Medicine\par [facility], [facility]\par \par \par [facility] [employer] [facility]\par

## 2020-08-26 NOTE — DISEASE MANAGEMENT
[Clinical] : TNM Stage: c [IA] : IA [NTNM] : 0 [FreeTextEntry4] : adenocarcinoma  [TTNM] : 1c(m) vs 3 [MTNM] : 0

## 2020-09-28 ENCOUNTER — APPOINTMENT (OUTPATIENT)
Dept: PULMONOLOGY | Facility: CLINIC | Age: 65
End: 2020-09-28
Payer: COMMERCIAL

## 2020-09-28 VITALS — HEART RATE: 118 BPM | DIASTOLIC BLOOD PRESSURE: 70 MMHG | OXYGEN SATURATION: 94 % | SYSTOLIC BLOOD PRESSURE: 116 MMHG

## 2020-09-28 DIAGNOSIS — R91.8 OTHER NONSPECIFIC ABNORMAL FINDING OF LUNG FIELD: ICD-10-CM

## 2020-09-28 DIAGNOSIS — Z23 ENCOUNTER FOR IMMUNIZATION: ICD-10-CM

## 2020-09-28 PROCEDURE — 99214 OFFICE O/P EST MOD 30 MIN: CPT | Mod: 25

## 2020-09-28 PROCEDURE — 90686 IIV4 VACC NO PRSV 0.5 ML IM: CPT

## 2020-09-28 PROCEDURE — G0008: CPT

## 2020-09-28 RX ORDER — QUININE SULFATE 260 MG
TABLET ORAL
Refills: 0 | Status: DISCONTINUED | COMMUNITY
End: 2020-09-28

## 2020-09-28 RX ORDER — ALBUTEROL SULFATE 90 UG/1
108 (90 BASE) AEROSOL, METERED RESPIRATORY (INHALATION)
Qty: 1 | Refills: 6 | Status: ACTIVE | COMMUNITY
Start: 2017-08-16 | End: 1900-01-01

## 2020-09-28 RX ORDER — FLUTICASONE FUROATE AND VILANTEROL TRIFENATATE 100; 25 UG/1; UG/1
100-25 POWDER RESPIRATORY (INHALATION) DAILY
Qty: 1 | Refills: 5 | Status: ACTIVE | COMMUNITY
Start: 2020-05-05 | End: 1900-01-01

## 2020-09-28 NOTE — DISCUSSION/SUMMARY
[FreeTextEntry1] : 65-year-old female, history of stage II chronic obstructive lung disease, and mild interstitial lung disease, well-controlled on her current drug regimen, seen today in followup. Diagnosis stage IV adenocarcinoma now being treated by [facility]. Patient clinically doing well with decreasing oxygen requirements. We'll continue to follow with pulmonary functions, etc.

## 2020-09-28 NOTE — HISTORY OF PRESENT ILLNESS
[TextBox_4] : 65-year-old female with a history of chronic pulmonary nodules, as well as mild interstitial disease with a recent diagnosis of stage IV adenocarcinoma. Patient has been maintained on supplemental oxygen since surgery on 9/4/20 at [facility], but has weaned her O2 during the course the day to room air with saturations of 94%. She denies any complaints of cough, wheeze, sputum, fevers, chills, with some residual left-sided chest pain. She is currently being seen at [facility] for further chemotherapy/immunotherapy with the considerations including Pembrolizumab,carboplatin, premetrexed

## 2020-09-28 NOTE — CONSULT LETTER
[Dear  ___] : Dear  [unfilled], [Consult Letter:] : I had the pleasure of evaluating your patient, [unfilled]. [Please see my note below.] : Please see my note below. [Sincerely,] : Sincerely, [FreeTextEntry3] : Les Schumacher MD FCCP\par Pulmonary/Critical Care/Sleep Medicine\par Department of Internal Medicine\par \par [facility] [employer] [facility]\par  [DrCeline  ___] : Dr. CHAPA

## 2020-10-25 ENCOUNTER — INPATIENT (INPATIENT)
Facility: HOSPITAL | Age: 65
LOS: 6 days | Discharge: HOME | DRG: 603 | End: 2020-11-01
Attending: INTERNAL MEDICINE | Admitting: HOSPITALIST
Payer: COMMERCIAL

## 2020-10-25 VITALS
WEIGHT: 162.04 LBS | SYSTOLIC BLOOD PRESSURE: 118 MMHG | HEART RATE: 102 BPM | RESPIRATION RATE: 18 BRPM | OXYGEN SATURATION: 96 % | DIASTOLIC BLOOD PRESSURE: 81 MMHG | HEIGHT: 63 IN | TEMPERATURE: 98 F

## 2020-10-25 DIAGNOSIS — Z90.710 ACQUIRED ABSENCE OF BOTH CERVIX AND UTERUS: Chronic | ICD-10-CM

## 2020-10-25 DIAGNOSIS — D16.9 BENIGN NEOPLASM OF BONE AND ARTICULAR CARTILAGE, UNSPECIFIED: Chronic | ICD-10-CM

## 2020-10-25 DIAGNOSIS — Z87.2 PERSONAL HISTORY OF DISEASES OF THE SKIN AND SUBCUTANEOUS TISSUE: Chronic | ICD-10-CM

## 2020-10-25 LAB
ALBUMIN SERPL ELPH-MCNC: 4.1 G/DL — SIGNIFICANT CHANGE UP (ref 3.3–5.2)
ALP SERPL-CCNC: 110 U/L — SIGNIFICANT CHANGE UP (ref 40–120)
ALT FLD-CCNC: 10 U/L — SIGNIFICANT CHANGE UP
ANION GAP SERPL CALC-SCNC: 14 MMOL/L — SIGNIFICANT CHANGE UP (ref 5–17)
APPEARANCE UR: CLEAR — SIGNIFICANT CHANGE UP
APTT BLD: 27.6 SEC — SIGNIFICANT CHANGE UP (ref 27.5–35.5)
AST SERPL-CCNC: 18 U/L — SIGNIFICANT CHANGE UP
BACTERIA # UR AUTO: ABNORMAL
BASOPHILS # BLD AUTO: 0.02 K/UL — SIGNIFICANT CHANGE UP (ref 0–0.2)
BASOPHILS NFR BLD AUTO: 0.3 % — SIGNIFICANT CHANGE UP (ref 0–2)
BILIRUB SERPL-MCNC: 0.5 MG/DL — SIGNIFICANT CHANGE UP (ref 0.4–2)
BILIRUB UR-MCNC: NEGATIVE — SIGNIFICANT CHANGE UP
BUN SERPL-MCNC: 20 MG/DL — SIGNIFICANT CHANGE UP (ref 8–20)
CALCIUM SERPL-MCNC: 9.7 MG/DL — SIGNIFICANT CHANGE UP (ref 8.6–10.2)
CHLORIDE SERPL-SCNC: 94 MMOL/L — LOW (ref 98–107)
CO2 SERPL-SCNC: 27 MMOL/L — SIGNIFICANT CHANGE UP (ref 22–29)
COLOR SPEC: YELLOW — SIGNIFICANT CHANGE UP
CREAT SERPL-MCNC: 0.79 MG/DL — SIGNIFICANT CHANGE UP (ref 0.5–1.3)
DIFF PNL FLD: ABNORMAL
EOSINOPHIL # BLD AUTO: 0.19 K/UL — SIGNIFICANT CHANGE UP (ref 0–0.5)
EOSINOPHIL NFR BLD AUTO: 3 % — SIGNIFICANT CHANGE UP (ref 0–6)
EPI CELLS # UR: SIGNIFICANT CHANGE UP
GLUCOSE SERPL-MCNC: 109 MG/DL — HIGH (ref 70–99)
GLUCOSE UR QL: NEGATIVE MG/DL — SIGNIFICANT CHANGE UP
HCT VFR BLD CALC: 36.3 % — SIGNIFICANT CHANGE UP (ref 34.5–45)
HGB BLD-MCNC: 12 G/DL — SIGNIFICANT CHANGE UP (ref 11.5–15.5)
IMM GRANULOCYTES NFR BLD AUTO: 0.3 % — SIGNIFICANT CHANGE UP (ref 0–1.5)
INR BLD: 1.07 RATIO — SIGNIFICANT CHANGE UP (ref 0.88–1.16)
KETONES UR-MCNC: NEGATIVE — SIGNIFICANT CHANGE UP
LACTATE BLDV-MCNC: 0.6 MMOL/L — SIGNIFICANT CHANGE UP (ref 0.5–2)
LEUKOCYTE ESTERASE UR-ACNC: ABNORMAL
LYMPHOCYTES # BLD AUTO: 2.3 K/UL — SIGNIFICANT CHANGE UP (ref 1–3.3)
LYMPHOCYTES # BLD AUTO: 36.6 % — SIGNIFICANT CHANGE UP (ref 13–44)
MCHC RBC-ENTMCNC: 31 PG — SIGNIFICANT CHANGE UP (ref 27–34)
MCHC RBC-ENTMCNC: 33.1 GM/DL — SIGNIFICANT CHANGE UP (ref 32–36)
MCV RBC AUTO: 93.8 FL — SIGNIFICANT CHANGE UP (ref 80–100)
MONOCYTES # BLD AUTO: 0.21 K/UL — SIGNIFICANT CHANGE UP (ref 0–0.9)
MONOCYTES NFR BLD AUTO: 3.3 % — SIGNIFICANT CHANGE UP (ref 2–14)
NEUTROPHILS # BLD AUTO: 3.55 K/UL — SIGNIFICANT CHANGE UP (ref 1.8–7.4)
NEUTROPHILS NFR BLD AUTO: 56.5 % — SIGNIFICANT CHANGE UP (ref 43–77)
NITRITE UR-MCNC: NEGATIVE — SIGNIFICANT CHANGE UP
PH UR: 6 — SIGNIFICANT CHANGE UP (ref 5–8)
PLATELET # BLD AUTO: 193 K/UL — SIGNIFICANT CHANGE UP (ref 150–400)
POTASSIUM SERPL-MCNC: 3.6 MMOL/L — SIGNIFICANT CHANGE UP (ref 3.5–5.3)
POTASSIUM SERPL-SCNC: 3.6 MMOL/L — SIGNIFICANT CHANGE UP (ref 3.5–5.3)
PROT SERPL-MCNC: 7.4 G/DL — SIGNIFICANT CHANGE UP (ref 6.6–8.7)
PROT UR-MCNC: 30 MG/DL
PROTHROM AB SERPL-ACNC: 12.4 SEC — SIGNIFICANT CHANGE UP (ref 10.6–13.6)
RBC # BLD: 3.87 M/UL — SIGNIFICANT CHANGE UP (ref 3.8–5.2)
RBC # FLD: 12.3 % — SIGNIFICANT CHANGE UP (ref 10.3–14.5)
RBC CASTS # UR COMP ASSIST: SIGNIFICANT CHANGE UP /HPF (ref 0–4)
SODIUM SERPL-SCNC: 134 MMOL/L — LOW (ref 135–145)
SP GR SPEC: 1.01 — SIGNIFICANT CHANGE UP (ref 1.01–1.02)
UROBILINOGEN FLD QL: NEGATIVE MG/DL — SIGNIFICANT CHANGE UP
WBC # BLD: 6.29 K/UL — SIGNIFICANT CHANGE UP (ref 3.8–10.5)
WBC # FLD AUTO: 6.29 K/UL — SIGNIFICANT CHANGE UP (ref 3.8–10.5)
WBC UR QL: SIGNIFICANT CHANGE UP

## 2020-10-25 PROCEDURE — 93971 EXTREMITY STUDY: CPT | Mod: 26,LT

## 2020-10-25 PROCEDURE — 71046 X-RAY EXAM CHEST 2 VIEWS: CPT | Mod: 26

## 2020-10-25 PROCEDURE — 99285 EMERGENCY DEPT VISIT HI MDM: CPT

## 2020-10-25 PROCEDURE — 73630 X-RAY EXAM OF FOOT: CPT | Mod: 26,LT

## 2020-10-25 PROCEDURE — 93010 ELECTROCARDIOGRAM REPORT: CPT

## 2020-10-25 RX ORDER — VANCOMYCIN HCL 1 G
1000 VIAL (EA) INTRAVENOUS ONCE
Refills: 0 | Status: COMPLETED | OUTPATIENT
Start: 2020-10-25 | End: 2020-10-25

## 2020-10-25 RX ORDER — ACETAMINOPHEN 500 MG
650 TABLET ORAL ONCE
Refills: 0 | Status: COMPLETED | OUTPATIENT
Start: 2020-10-25 | End: 2020-10-25

## 2020-10-25 RX ADMIN — Medication 650 MILLIGRAM(S): at 21:47

## 2020-10-25 RX ADMIN — Medication 900 MILLIGRAM(S): at 22:17

## 2020-10-25 RX ADMIN — Medication 100 MILLIGRAM(S): at 21:47

## 2020-10-25 RX ADMIN — Medication 250 MILLIGRAM(S): at 22:18

## 2020-10-25 NOTE — ED PROVIDER NOTE - ATTENDING CONTRIBUTION TO CARE
I, Alex Mendoza, performed a face to face bedside interview with this patient regarding history of present illness, review of symptoms and relevant past medical, social and family history.  I completed an independent physical examination. I have communicated the patient’s plan of care and disposition with the ACP.    66 y/o F with PMHx lung cancer (on chemo and home O2), calcaneal fracture, COPD, pneumonia presents to ED c/o low grade fever (Tmax 100.0 F) and left foot erythema over past 2 days. Pt reports she recently started chemo for stage 4 lung CA, is on home O2 1 L.  pe awake in nad heent ncat neck supple cor s1 s2 lungs clear abd soft left lower ext; erythma of left foot;   dx cellulitis;  iv abx admit;

## 2020-10-25 NOTE — ED PROVIDER NOTE - PROGRESS NOTE DETAILS
REZA Vines NOTE: US negative for DVT, will admit to medicine for continued management of fever and cellulitis. Pt agreeable to admission. Medicine team accepted admission, all further care and disposition transferred to medicine team.

## 2020-10-25 NOTE — ED PROVIDER NOTE - CLINICAL SUMMARY MEDICAL DECISION MAKING FREE TEXT BOX
64 y/o F with PMHx lung cancer (on chemo and home O2), calcaneal fracture, COPD, pneumonia presents to ED c/o low grade fever (Tmax 100.0 F) and left foot erythema over past 2 days. Pt reports she has a calcaneous fracture from August 2020 that she has been wearing a boot and doing PT. Also with increased non productive cough x 2 days.  -Will check labs, XR foot and chest, US duplex to eval DVT, abx for cellulitis and urine

## 2020-10-25 NOTE — ED PROVIDER NOTE - PMH
Calcaneal fracture    Cervical cancer  s/p hysterectomy 2005  COPD without exacerbation  no recent hospitalizations  Lung cancer    Lung nodule  multiple nodules pending biopsy  Pneumonia  May 2020 treated outpatient  Shoulder pain with history of repair of rotator cuff  2015

## 2020-10-25 NOTE — ED ADULT TRIAGE NOTE - CHIEF COMPLAINT QUOTE
Pt A&Ox4 states "I had a low grade fever and my left foot is swollen and red." Patient has a fracture to heal, is on chemo for satge 4 lung CA.

## 2020-10-25 NOTE — ED PROVIDER NOTE - OBJECTIVE STATEMENT
64 y/o F with PMHx lung cancer (on chemo and home O2), calcaneal fracture, COPD, pneumonia presents to ED c/o low grade fever (Tmax 100.0 F) and left foot erythema over past 2 days. Pt reports she recently started chemo for stage 4 lung CA, is on home O2 1 L. Pt reports she has a calcaneous fracture from August 2020 that she has been wearing a boot and doing PT. Pt reports 2 days ago she noticed left foot erythema and swelling. Also with increased non productive cough x 2 days. Denies exposure to known COVID positive pt, CP, worsening SOB, numbness, tingling.

## 2020-10-25 NOTE — ED ADULT NURSE NOTE - OBJECTIVE STATEMENT
pt arrived c/o left foot pain redness, fevers and chills. left foot red, swollen, hot to touch and pt unable to move. +pedal pulses, sensation present. pt states started yesterday and had low grade fever today in the am. pt is aao x4. respirations even and unlabored. skin color wnl warm and dry. on 1-2L nc 24/7 for lung CA. pt denies chest pain, sob, dizziness, nausea, vomiting. appears comfortable. labs drawn and ekg done. pending US.

## 2020-10-25 NOTE — ED PROVIDER NOTE - PHYSICAL EXAMINATION
Vital signs noted, see flowsheet.  General: NAD, well appearing and non-toxic.  HEENT: NC/AT. MMM. No nasal discharge, throat clear.  Conjunctiva and sclera clear b/l.  EOMI. PERRL.  Neck: Soft and supple, full ROM without pain.  Cardiac: RRR. +S1/S2. Peripheral pulses 2+ and symmetric b/l.   Respiratory: Speaking in full sentences, Lungs CTA b/l, comfortable on 1 L NC  Abdomen: Soft, NTND. No guarding or rebound tenderness.   Back: Spine midline and non-tender. No CVAT.  Ext: Tenderness to left calcaneous, no ttp to calf   Skin: Left foot with erythema and swelling, no bruising. No sloughing.   Neuro: Awake, alert and oriented to person/place/time/situation. Moves all extremities spontaneously and symmetrically.  No focal deficits or facial droop.   Psych: Normal affect

## 2020-10-26 DIAGNOSIS — L03.116 CELLULITIS OF LEFT LOWER LIMB: ICD-10-CM

## 2020-10-26 LAB
ANION GAP SERPL CALC-SCNC: 15 MMOL/L — SIGNIFICANT CHANGE UP (ref 5–17)
BASOPHILS # BLD AUTO: 0.01 K/UL — SIGNIFICANT CHANGE UP (ref 0–0.2)
BASOPHILS NFR BLD AUTO: 0.2 % — SIGNIFICANT CHANGE UP (ref 0–2)
BUN SERPL-MCNC: 23 MG/DL — HIGH (ref 8–20)
CALCIUM SERPL-MCNC: 9.6 MG/DL — SIGNIFICANT CHANGE UP (ref 8.6–10.2)
CHLORIDE SERPL-SCNC: 96 MMOL/L — LOW (ref 98–107)
CO2 SERPL-SCNC: 25 MMOL/L — SIGNIFICANT CHANGE UP (ref 22–29)
CREAT SERPL-MCNC: 0.78 MG/DL — SIGNIFICANT CHANGE UP (ref 0.5–1.3)
CULTURE RESULTS: SIGNIFICANT CHANGE UP
EOSINOPHIL # BLD AUTO: 0.14 K/UL — SIGNIFICANT CHANGE UP (ref 0–0.5)
EOSINOPHIL NFR BLD AUTO: 3.2 % — SIGNIFICANT CHANGE UP (ref 0–6)
GLUCOSE SERPL-MCNC: 120 MG/DL — HIGH (ref 70–99)
HCT VFR BLD CALC: 34.1 % — LOW (ref 34.5–45)
HGB BLD-MCNC: 11.5 G/DL — SIGNIFICANT CHANGE UP (ref 11.5–15.5)
IMM GRANULOCYTES NFR BLD AUTO: 0.7 % — SIGNIFICANT CHANGE UP (ref 0–1.5)
LYMPHOCYTES # BLD AUTO: 1.35 K/UL — SIGNIFICANT CHANGE UP (ref 1–3.3)
LYMPHOCYTES # BLD AUTO: 30.4 % — SIGNIFICANT CHANGE UP (ref 13–44)
MAGNESIUM SERPL-MCNC: 2.1 MG/DL — SIGNIFICANT CHANGE UP (ref 1.6–2.6)
MCHC RBC-ENTMCNC: 31.2 PG — SIGNIFICANT CHANGE UP (ref 27–34)
MCHC RBC-ENTMCNC: 33.7 GM/DL — SIGNIFICANT CHANGE UP (ref 32–36)
MCV RBC AUTO: 92.4 FL — SIGNIFICANT CHANGE UP (ref 80–100)
MONOCYTES # BLD AUTO: 0.19 K/UL — SIGNIFICANT CHANGE UP (ref 0–0.9)
MONOCYTES NFR BLD AUTO: 4.3 % — SIGNIFICANT CHANGE UP (ref 2–14)
NEUTROPHILS # BLD AUTO: 2.72 K/UL — SIGNIFICANT CHANGE UP (ref 1.8–7.4)
NEUTROPHILS NFR BLD AUTO: 61.2 % — SIGNIFICANT CHANGE UP (ref 43–77)
PHOSPHATE SERPL-MCNC: 3.7 MG/DL — SIGNIFICANT CHANGE UP (ref 2.4–4.7)
PLATELET # BLD AUTO: 189 K/UL — SIGNIFICANT CHANGE UP (ref 150–400)
POTASSIUM SERPL-MCNC: 3.7 MMOL/L — SIGNIFICANT CHANGE UP (ref 3.5–5.3)
POTASSIUM SERPL-SCNC: 3.7 MMOL/L — SIGNIFICANT CHANGE UP (ref 3.5–5.3)
RBC # BLD: 3.69 M/UL — LOW (ref 3.8–5.2)
RBC # FLD: 12.3 % — SIGNIFICANT CHANGE UP (ref 10.3–14.5)
SARS-COV-2 IGG SERPL QL IA: NEGATIVE — SIGNIFICANT CHANGE UP
SARS-COV-2 IGM SERPL IA-ACNC: <0.1 INDEX — SIGNIFICANT CHANGE UP
SARS-COV-2 RNA SPEC QL NAA+PROBE: SIGNIFICANT CHANGE UP
SODIUM SERPL-SCNC: 136 MMOL/L — SIGNIFICANT CHANGE UP (ref 135–145)
SPECIMEN SOURCE: SIGNIFICANT CHANGE UP
WBC # BLD: 4.44 K/UL — SIGNIFICANT CHANGE UP (ref 3.8–10.5)
WBC # FLD AUTO: 4.44 K/UL — SIGNIFICANT CHANGE UP (ref 3.8–10.5)

## 2020-10-26 PROCEDURE — 99221 1ST HOSP IP/OBS SF/LOW 40: CPT | Mod: 24

## 2020-10-26 PROCEDURE — 99222 1ST HOSP IP/OBS MODERATE 55: CPT

## 2020-10-26 PROCEDURE — 12345: CPT | Mod: NC

## 2020-10-26 RX ORDER — VANCOMYCIN HCL 1 G
1000 VIAL (EA) INTRAVENOUS EVERY 12 HOURS
Refills: 0 | Status: DISCONTINUED | OUTPATIENT
Start: 2020-10-26 | End: 2020-10-26

## 2020-10-26 RX ORDER — SENNA PLUS 8.6 MG/1
2 TABLET ORAL AT BEDTIME
Refills: 0 | Status: DISCONTINUED | OUTPATIENT
Start: 2020-10-26 | End: 2020-11-01

## 2020-10-26 RX ORDER — IBUPROFEN 200 MG
0 TABLET ORAL
Qty: 0 | Refills: 0 | DISCHARGE

## 2020-10-26 RX ORDER — ALPRAZOLAM 0.25 MG
0.5 TABLET ORAL DAILY
Refills: 0 | Status: DISCONTINUED | OUTPATIENT
Start: 2020-10-26 | End: 2020-11-01

## 2020-10-26 RX ORDER — MORPHINE SULFATE 50 MG/1
2 CAPSULE, EXTENDED RELEASE ORAL ONCE
Refills: 0 | Status: DISCONTINUED | OUTPATIENT
Start: 2020-10-26 | End: 2020-10-26

## 2020-10-26 RX ORDER — GABAPENTIN 400 MG/1
100 CAPSULE ORAL
Refills: 0 | Status: DISCONTINUED | OUTPATIENT
Start: 2020-10-26 | End: 2020-11-01

## 2020-10-26 RX ORDER — ALBUTEROL 90 UG/1
2 AEROSOL, METERED ORAL EVERY 6 HOURS
Refills: 0 | Status: DISCONTINUED | OUTPATIENT
Start: 2020-10-26 | End: 2020-11-01

## 2020-10-26 RX ORDER — FOLIC ACID 0.8 MG
1 TABLET ORAL DAILY
Refills: 0 | Status: DISCONTINUED | OUTPATIENT
Start: 2020-10-26 | End: 2020-11-01

## 2020-10-26 RX ORDER — ENOXAPARIN SODIUM 100 MG/ML
40 INJECTION SUBCUTANEOUS DAILY
Refills: 0 | Status: DISCONTINUED | OUTPATIENT
Start: 2020-10-26 | End: 2020-11-01

## 2020-10-26 RX ORDER — POLYETHYLENE GLYCOL 3350 17 G/17G
17 POWDER, FOR SOLUTION ORAL DAILY
Refills: 0 | Status: DISCONTINUED | OUTPATIENT
Start: 2020-10-26 | End: 2020-11-01

## 2020-10-26 RX ORDER — SENNA PLUS 8.6 MG/1
2 TABLET ORAL
Qty: 0 | Refills: 0 | DISCHARGE

## 2020-10-26 RX ORDER — VANCOMYCIN HCL 1 G
1000 VIAL (EA) INTRAVENOUS EVERY 12 HOURS
Refills: 0 | Status: DISCONTINUED | OUTPATIENT
Start: 2020-10-27 | End: 2020-10-28

## 2020-10-26 RX ORDER — VANCOMYCIN HCL 1 G
1000 VIAL (EA) INTRAVENOUS EVERY 8 HOURS
Refills: 0 | Status: DISCONTINUED | OUTPATIENT
Start: 2020-10-26 | End: 2020-10-26

## 2020-10-26 RX ORDER — FLUTICASONE FUROATE AND VILANTEROL TRIFENATATE 100; 25 UG/1; UG/1
1 POWDER RESPIRATORY (INHALATION)
Qty: 0 | Refills: 0 | DISCHARGE

## 2020-10-26 RX ORDER — ACETAMINOPHEN 500 MG
650 TABLET ORAL EVERY 6 HOURS
Refills: 0 | Status: DISCONTINUED | OUTPATIENT
Start: 2020-10-26 | End: 2020-11-01

## 2020-10-26 RX ORDER — ESCITALOPRAM OXALATE 10 MG/1
5 TABLET, FILM COATED ORAL DAILY
Refills: 0 | Status: DISCONTINUED | OUTPATIENT
Start: 2020-10-26 | End: 2020-10-29

## 2020-10-26 RX ADMIN — Medication 1 MILLIGRAM(S): at 12:43

## 2020-10-26 RX ADMIN — Medication 250 MILLIGRAM(S): at 12:41

## 2020-10-26 RX ADMIN — ESCITALOPRAM OXALATE 5 MILLIGRAM(S): 10 TABLET, FILM COATED ORAL at 12:43

## 2020-10-26 RX ADMIN — ENOXAPARIN SODIUM 40 MILLIGRAM(S): 100 INJECTION SUBCUTANEOUS at 12:44

## 2020-10-26 RX ADMIN — Medication 100 MILLIGRAM(S): at 21:38

## 2020-10-26 RX ADMIN — Medication 100 MILLIGRAM(S): at 05:18

## 2020-10-26 RX ADMIN — GABAPENTIN 100 MILLIGRAM(S): 400 CAPSULE ORAL at 16:52

## 2020-10-26 RX ADMIN — Medication 250 MILLIGRAM(S): at 05:18

## 2020-10-26 RX ADMIN — Medication 0.5 MILLIGRAM(S): at 12:51

## 2020-10-26 RX ADMIN — POLYETHYLENE GLYCOL 3350 17 GRAM(S): 17 POWDER, FOR SOLUTION ORAL at 12:43

## 2020-10-26 RX ADMIN — MORPHINE SULFATE 2 MILLIGRAM(S): 50 CAPSULE, EXTENDED RELEASE ORAL at 16:48

## 2020-10-26 RX ADMIN — SENNA PLUS 2 TABLET(S): 8.6 TABLET ORAL at 21:38

## 2020-10-26 RX ADMIN — Medication 100 MILLIGRAM(S): at 15:15

## 2020-10-26 RX ADMIN — GABAPENTIN 100 MILLIGRAM(S): 400 CAPSULE ORAL at 21:38

## 2020-10-26 RX ADMIN — MORPHINE SULFATE 2 MILLIGRAM(S): 50 CAPSULE, EXTENDED RELEASE ORAL at 15:15

## 2020-10-26 NOTE — ED ADULT NURSE REASSESSMENT NOTE - NS ED NURSE REASSESS COMMENT FT1
pt resting comfortably. hospitalist at bedside. no complaints. aao x4. respirations even and unlabored. denies cp ,sob tolerating 1L nc. no distress noted.

## 2020-10-26 NOTE — H&P ADULT - NSHPPHYSICALEXAM_GEN_ALL_CORE
Vital Signs Last 24 Hrs  T(C): 37.1 (26 Oct 2020 01:03), Max: 37.6 (25 Oct 2020 21:53)  T(F): 98.7 (26 Oct 2020 01:03), Max: 99.7 (25 Oct 2020 21:53)  HR: 88 (26 Oct 2020 01:03) (88 - 102)  BP: 112/55 (26 Oct 2020 01:03) (112/55 - 118/81)  BP(mean): --  RR: 17 (26 Oct 2020 01:03) (17 - 18)  SpO2: 99% (26 Oct 2020 01:03) (96% - 99%)    GENERAL: NAD, appears stated age  HEENT:  Atraumatic, Normocephalic, MMM, no oropharyngeal lesions  EYES: EOMI, PERRLA, conjunctiva and sclera clear  NECK: Supple, No JVD, no throat tenderness.  CHEST/LUNG: Clear to auscultation bilaterally; No wheezes, rales, or rhonchi  HEART: Regular rate and rhythm; No murmurs, rubs, or gallops  ABDOMEN: Soft, Nontender, Nondistended; Bowel sounds present  EXTREMITIES:  2+ Peripheral Pulses, No clubbing, cyanosis, or edema  PSYCH: AAOx3, normal affect  NEUROLOGY: moves all extremities spontaneously. no sensory deficits  SKIN: L foot edema and erythema mostly in forefoot and mid foot, tenderness to palpation. NO other edema or erytyhema/rash

## 2020-10-26 NOTE — H&P ADULT - HISTORY OF PRESENT ILLNESS
65F hx COPD on home O2, anxiety, recent calcaneal fx, recent dx lung ca on chemo presents with worsening L foot swelling and erythema. Patient states diagnosed with Lung Ca over the summer and just recently started on chemotherapy last week (unclear exactly what, goes to [facility]). In August she also had a fall resulting in a calcaneal fracture. She didn't require surgery, but it was placed in a cast. This past week cast removed and patient started on PT and was walking on eleazar boot. Two days ago she noticed worsening redness and swelling of left foot. This continued to get worse and she started getting low grade fevers. Fevers and pain continued so she came to ED.     In ED, p tmax 100, p- 102, bp 118/81, RR- 18 and spo2 96% on RA. Pt received clindamycin and vancomycin. Denies cough, chest pain, sob, nausea, vomiting, diarrhea or dysuria.

## 2020-10-26 NOTE — CONSULT NOTE ADULT - SUBJECTIVE AND OBJECTIVE BOX
Pt Name: TAMAR LIM    MRN: [id number]      Patient is a 65y Female presenting to the emergency department with a chief complaint of left foot pain and swelling x 2 days. Pt is s/p left calcaneus fx treated nonoperatively in august with cindi and julio c. She was doing well and ambulated with knee walker. Pt was diagnosed with stage 4 lung ca and started chemotherapy. She began putting some weight on her left foot after her last xray with PT. She was doing well until saturday when she noticed increased pain and swelling in her left foot. She had worsening swelling while up and OOB but would improve with rest and elevation. Now she has pain when foot hangs down. She had fever and came to ED for cellulitis. No numbness. No other complaints.  .    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 650 milliGRAM(s) Oral every 6 hours PRN  ALBUTerol    90 MICROgram(s) HFA Inhaler 2 Puff(s) Inhalation every 6 hours PRN  ALPRAZolam 0.5 milliGRAM(s) Oral daily PRN  clindamycin IVPB 600 milliGRAM(s) IV Intermittent every 8 hours  enoxaparin Injectable 40 milliGRAM(s) SubCutaneous daily  escitalopram 5 milliGRAM(s) Oral daily  folic acid 1 milliGRAM(s) Oral daily  polyethylene glycol 3350 17 Gram(s) Oral daily  senna 2 Tablet(s) Oral at bedtime  vancomycin  IVPB 1000 milliGRAM(s) IV Intermittent every 8 hours      FAMILY HISTORY:  FH: COPD (chronic obstructive pulmonary disease)    : non-contributory    Social History:     Ambulation: Walking independently [ ] With Cane [ ] With Walker [X]  Bedbound [ ]                           11.5   4.44  )-----------( 189      ( 26 Oct 2020 09:14 )             34.1     10-26    136  |  96<L>  |  23.0<H>  ----------------------------<  120<H>  3.7   |  25.0  |  0.78    Ca    9.6      26 Oct 2020 09:14  Phos  3.7     10-26  Mg     2.1     10-26    TPro  7.4  /  Alb  4.1  /  TBili  0.5  /  DBili  x   /  AST  18  /  ALT  10  /  AlkPhos  110  10-25      PHYSICAL EXAM:    Vital Signs Last 24 Hrs  T(C): 36.4 (26 Oct 2020 07:44), Max: 37.6 (25 Oct 2020 21:53)  T(F): 97.5 (26 Oct 2020 07:44), Max: 99.7 (25 Oct 2020 21:53)  HR: 86 (26 Oct 2020 07:44) (80 - 102)  BP: 92/51 (26 Oct 2020 07:44) (92/51 - 118/81)  BP(mean): --  RR: 18 (26 Oct 2020 07:44) (17 - 18)  SpO2: 97% (26 Oct 2020 07:44) (96% - 99%)  Daily Height in cm: 160.02 (25 Oct 2020 19:57)    Daily     Appearance: Alert, responsive, in no acute distress.    Neurological: Sensation is grossly intact to light touch. 5/5 motor function of all extremities. No focal deficits or weaknesses found.    Skin: no rash on visible skin. Skin is clean, dry and intact. No bleeding. No abrasions. No ulcerations.    Vascular: 2+ distal pulses. Cap refill < 2 sec.  No cyanosis.    Musculoskeletal:       Left Lower Extremity: +mild swelling foot. +erythema/ecchymosis distal foot. +TTP distal foot. +EHL/FHL. Calf soft, NT. Sensation intact. DP 2+. Brisk cap refill.    Imaging Studies:  xrays left foot    A/P:  Pt is a  65y Female with left foot cellulitis, s/p left calcaneus fx    PLAN:   -admitted to medicine   -continue IV abx  -elevate LLE  -pain control  -continue PT  -d/w Dr Cesar

## 2020-10-26 NOTE — H&P ADULT - NSHPLABSRESULTS_GEN_ALL_CORE
12.0   6.29  )-----------( 193      ( 25 Oct 2020 21:05 )             36.3       10-25    134<L>  |  94<L>  |  20.0  ----------------------------<  109<H>  3.6   |  27.0  |  0.79    Ca    9.7      25 Oct 2020 21:05    TPro  7.4  /  Alb  4.1  /  TBili  0.5  /  DBili  x   /  AST  18  /  ALT  10  /  AlkPhos  110  10-25        Urinalysis Basic - ( 25 Oct 2020 20:50 )    Color: Yellow / Appearance: Clear / S.010 / pH: x  Gluc: x / Ketone: Negative  / Bili: Negative / Urobili: Negative mg/dL   Blood: x / Protein: 30 mg/dL / Nitrite: Negative   Leuk Esterase: Trace / RBC: 0-2 /HPF / WBC 0-2   Sq Epi: x / Non Sq Epi: Occasional / Bacteria: Occasional    PT/INR - ( 25 Oct 2020 21:05 )   PT: 12.4 sec;   INR: 1.07 ratio     PTT - ( 25 Oct 2020 21:05 )  PTT:27.6 sec    Lactate Trend    CAPILLARY BLOOD GLUCOSE    RADIOLOGY, EKG & ADDITIONAL TESTS: Reviewed.    CXR- lungs grossly clear

## 2020-10-26 NOTE — CONSULT NOTE ADULT - ASSESSMENT
Imp:  66 yo female with Stage IV NSCLC, inoperable, post first cycle of chemotherapy last week, admitted with cellulitis  Improving on IV antibiotics  Rec:  Monitor WBC post chemo  Antibiotics as ordered  DVTprophylaxis

## 2020-10-26 NOTE — H&P ADULT - ASSESSMENT
65F hx COPD on home O2, anxiety, recent calcaneal fx, recent dx lung ca on chemo presents with worsening L foot swelling and erythema found to have cellulitis.       #Cellulitis  -L foot cellulitis extending to ankle  -no clear abscess or pustules  -not meeting sepsis criteria, but immunocompromised given recent chemotherapy  -given extensive cellulitis will admit for IV abx  -f/u final xray read  -c/w clindamycin and vancomycin iv      #COPD on home O2  -c/w O2 prn  -c/w breo ellipta (pt own med)  -albuterol prn    #Lung Ca on chemo  -outpatient follow up with [facility]    #Anxiety  -c/w lexapro and xanax 0.5 prn

## 2020-10-26 NOTE — PROGRESS NOTE ADULT - SUBJECTIVE AND OBJECTIVE BOX
Patient was seen and examined at bedside.   Redness is improving in left foot. Swelling is same.   No fever or chills.  She has chronic shortness of breath, denies any change in it.    PHYSICAL EXAM:  Vital Signs   T(C): 36.4 (26 Oct 2020 07:44), Max: 37.6 (25 Oct 2020 21:53)  T(F): 97.5 (26 Oct 2020 07:44), Max: 99.7 (25 Oct 2020 21:53)  HR: 86 (26 Oct 2020 07:44) (80 - 102)  BP: 92/51 (26 Oct 2020 07:44) (92/51 - 118/81)  RR: 18 (26 Oct 2020 07:44) (17 - 18)  SpO2: 97% (26 Oct 2020 07:44) (96% - 99%)  General: Elderly female sitting in bed comfortably. No acute distress  HEENT: EOMI. Clear conjunctivae. Moist mucus membrane  Neck: Supple.   Chest: CTA bilaterally - no wheezing, rales or rhonchi.   Heart: Normal S1 & S2 with RRR.   Abdomen: Soft. Non-tender. Non-distended. + BS  Ext: No pedal edema. No calf tenderness. Left Foot: Swollen, erythematous, warm and tender on dorsal aspect. ROM limited.   Neuro: AAO x 3. No focal deficit. No speech disorder.  Skin: Warm and Dry  Psychiatry: Normal mood and affect    Labs and Radiology reviewed.     Plan:   Continue current treatment.  Patient is due to see Ortho, will place the consult.     Please refer to H&P from earlier today for more details.

## 2020-10-26 NOTE — CONSULT NOTE ADULT - ATTENDING COMMENTS
Orthopaedic Trauma Surgeon Addendum:    I have personally performed a face-to-face diagnostic evaluation on this patient.  I have reviewed the physician assistant note and agree with the history, exam, and plan of care, except as noted.     No ortho intervention at this time. WBAT. Please call with any questions.     Saúl Cesar MD  Orthopaedic Trauma Surgeon  [facility] [place]

## 2020-10-26 NOTE — ED ADULT NURSE REASSESSMENT NOTE - NS ED NURSE REASSESS COMMENT FT1
report given to ESSU VICTORIANO Colindres. pt has no complaints. aao x4. respirations even and unlabored. resting comfortably.

## 2020-10-26 NOTE — H&P ADULT - NSICDXPASTMEDICALHX_GEN_ALL_CORE_FT
PAST MEDICAL HISTORY:  Calcaneal fracture     Cervical cancer s/p hysterectomy 2005    COPD without exacerbation no recent hospitalizations    Lung cancer     Lung nodule multiple nodules pending biopsy    Pneumonia May 2020 treated outpatient    Shoulder pain with history of repair of rotator cuff 2015

## 2020-10-26 NOTE — CONSULT NOTE ADULT - SUBJECTIVE AND OBJECTIVE BOX
HPI: Patient is a 65y Female seen on consultation for the evaluation and management of lung cancer; [facility]  Pt has Stage IV lung cancer  in chest, s/p attempt at resection 2020; found to be unresectable.  Received first tx last week with Carbopl based regimin with Keytruda per pt report.  Also had broken left foot prior.  Came to ER with swelling and erythema in left foot, assoc with fever to 100 and chills.  Now on antibiotics with improvement in swelling and erythema.  Dyspnea at baseline; no cough, no hemoptysis  No nausea or vomiting.     PAST MEDICAL & SURGICAL HISTORY:  Calcaneal fracture    Lung cancer    Lung nodule  multiple nodules pending biopsy    Pneumonia  May 2020 treated outpatient    COPD without exacerbation  no recent hospitalizations    Shoulder pain with history of repair of rotator cuff      Cervical cancer  s/p hysterectomy     H/O total hysterectomy      H/O cyst of breast  removed with biopsy     Osteochondroma of bone  left lower extremity s/p surgery at 15 years of age        REVIEW OF SYSTEMS      General:low grade fevers	    Skin/Breast:no rash  	  Ophthalmologic:no blurry vision  	  ENMT:	no spre throat or problem swallowing    Respiratory and Thorax:no cough; no exacerbtn of dyspneas  	  Cardiovascular:	no chest pain    Gastrointestinal:	no N/V/D    Genitourinary:	no dysuria    Musculoskeletal:	Left foot swelling and erythema      Hematology/Lymphatics:	no bleeding            MEDICATIONS  (STANDING):  clindamycin IVPB 600 milliGRAM(s) IV Intermittent every 8 hours  enoxaparin Injectable 40 milliGRAM(s) SubCutaneous daily  escitalopram 5 milliGRAM(s) Oral daily  folic acid 1 milliGRAM(s) Oral daily  polyethylene glycol 3350 17 Gram(s) Oral daily  senna 2 Tablet(s) Oral at bedtime  vancomycin  IVPB 1000 milliGRAM(s) IV Intermittent every 8 hours    MEDICATIONS  (PRN):  acetaminophen   Tablet .. 650 milliGRAM(s) Oral every 6 hours PRN Temp greater or equal to 38C (100.4F), Mild Pain (1 - 3), Moderate Pain (4 - 6)  ALBUTerol    90 MICROgram(s) HFA Inhaler 2 Puff(s) Inhalation every 6 hours PRN Shortness of Breath and/or Wheezing  ALPRAZolam 0.5 milliGRAM(s) Oral daily PRN anxiety      Allergies    amoxicillin (Unknown)  Keflex (Unknown)  penicillin (Unknown)  penicillins (Other)    Intolerances        SOCIAL HISTORY:    Smoking Status:former smoker  Alcohol:denied    Occupation:    FAMILY HISTORY:  FH: COPD (chronic obstructive pulmonary disease)          	                  Vital Signs Last 24 Hrs  T(C): 36.4 (26 Oct 2020 07:44), Max: 37.6 (25 Oct 2020 21:53)  T(F): 97.5 (26 Oct 2020 07:44), Max: 99.7 (25 Oct 2020 21:53)  HR: 86 (26 Oct 2020 07:44) (80 - 102)  BP: 92/51 (26 Oct 2020 07:44) (92/51 - 118/81)  BP(mean): --  RR: 18 (26 Oct 2020 07:44) (17 - 18)  SpO2: 97% (26 Oct 2020 07:44) (96% - 99%)    PHYSICAL EXAM:      Constitutional:Awake, alert, NAD, breathing comfortably    Eyes:anicteric    Neck:no adenopathy      Respiratory:decreased  BS left side    Cardiovascular:RRR norm S1S2    Gastrointestinal:soft, non-tender      Extremities:Left foot is swollen and erythematous on dorsum              LABS:                        12.0   6.29  )-----------( 193      ( 25 Oct 2020 21:05 )             36.3     10-25    134<L>  |  94<L>  |  20.0  ----------------------------<  109<H>  3.6   |  27.0  |  0.79    Ca    9.7      25 Oct 2020 21:05    TPro  7.4  /  Alb  4.1  /  TBili  0.5  /  DBili  x   /  AST  18  /  ALT  10  /  AlkPhos  110  10-25    PT/INR - ( 25 Oct 2020 21:05 )   PT: 12.4 sec;   INR: 1.07 ratio         PTT - ( 25 Oct 2020 21:05 )  PTT:27.6 sec  Urinalysis Basic - ( 25 Oct 2020 20:50 )    Color: Yellow / Appearance: Clear / S.010 / pH: x  Gluc: x / Ketone: Negative  / Bili: Negative / Urobili: Negative mg/dL   Blood: x / Protein: 30 mg/dL / Nitrite: Negative   Leuk Esterase: Trace / RBC: 0-2 /HPF / WBC 0-2   Sq Epi: x / Non Sq Epi: Occasional / Bacteria: Occasional        RADIOLOGY & ADDITIONAL STUDIES:

## 2020-10-26 NOTE — H&P ADULT - NSHPREVIEWOFSYSTEMS_GEN_ALL_CORE
REVIEW OF SYSTEMS:    CONSTITUTIONAL: No weakness, +fever  EYES/ENT: No visual changes;  No vertigo or throat pain   NECK: No pain or stiffness  RESPIRATORY: No cough, wheezing, hemoptysis; No shortness of breath  CARDIOVASCULAR: No chest pain or palpitations  GASTROINTESTINAL: No abdominal or epigastric pain. No nausea, vomiting, or hematemesis; No diarrhea or constipation. No melena or hematochezia.  GENITOURINARY: No dysuria, frequency or hematuria  NEUROLOGICAL: No numbness or weakness  SKIN: cellulitis as above  All other review of systems is negative unless indicated above.

## 2020-10-27 LAB
HCV AB S/CO SERPL IA: 0.11 S/CO — SIGNIFICANT CHANGE UP (ref 0–0.99)
HCV AB SERPL-IMP: SIGNIFICANT CHANGE UP
VANCOMYCIN TROUGH SERPL-MCNC: 17 UG/ML — SIGNIFICANT CHANGE UP (ref 10–20)

## 2020-10-27 PROCEDURE — 99233 SBSQ HOSP IP/OBS HIGH 50: CPT

## 2020-10-27 RX ORDER — KETOROLAC TROMETHAMINE 30 MG/ML
30 SYRINGE (ML) INJECTION ONCE
Refills: 0 | Status: DISCONTINUED | OUTPATIENT
Start: 2020-10-27 | End: 2020-10-27

## 2020-10-27 RX ADMIN — Medication 0.5 MILLIGRAM(S): at 12:07

## 2020-10-27 RX ADMIN — Medication 100 MILLIGRAM(S): at 21:24

## 2020-10-27 RX ADMIN — GABAPENTIN 100 MILLIGRAM(S): 400 CAPSULE ORAL at 05:32

## 2020-10-27 RX ADMIN — ESCITALOPRAM OXALATE 5 MILLIGRAM(S): 10 TABLET, FILM COATED ORAL at 21:23

## 2020-10-27 RX ADMIN — Medication 100 MILLIGRAM(S): at 05:32

## 2020-10-27 RX ADMIN — Medication 250 MILLIGRAM(S): at 00:41

## 2020-10-27 RX ADMIN — POLYETHYLENE GLYCOL 3350 17 GRAM(S): 17 POWDER, FOR SOLUTION ORAL at 05:32

## 2020-10-27 RX ADMIN — Medication 100 MILLIGRAM(S): at 14:12

## 2020-10-27 RX ADMIN — GABAPENTIN 100 MILLIGRAM(S): 400 CAPSULE ORAL at 17:57

## 2020-10-27 RX ADMIN — Medication 30 MILLIGRAM(S): at 22:45

## 2020-10-27 RX ADMIN — ENOXAPARIN SODIUM 40 MILLIGRAM(S): 100 INJECTION SUBCUTANEOUS at 12:07

## 2020-10-27 RX ADMIN — Medication 650 MILLIGRAM(S): at 14:33

## 2020-10-27 RX ADMIN — SENNA PLUS 2 TABLET(S): 8.6 TABLET ORAL at 21:27

## 2020-10-27 RX ADMIN — Medication 1 MILLIGRAM(S): at 05:32

## 2020-10-27 RX ADMIN — Medication 250 MILLIGRAM(S): at 12:18

## 2020-10-27 RX ADMIN — Medication 30 MILLIGRAM(S): at 21:30

## 2020-10-27 RX ADMIN — Medication 650 MILLIGRAM(S): at 17:57

## 2020-10-27 NOTE — PROGRESS NOTE ADULT - SUBJECTIVE AND OBJECTIVE BOX
HPI: Patient is a 65y Female seen on consultation for the evaluation and management of lung cancer; [facility]  Pt has Stage IV lung cancer  in chest, s/p attempt at resection 2020; found to be unresectable.  Received first tx last week with Carbopl based regimin with Keytruda per pt report.  Also had broken left foot prior.  Came to ER with swelling and erythema in left foot, assoc with fever to 100 and chills.  Now on antibiotics with improvement in swelling and erythema.  Dyspnea at baseline; no cough, no hemoptysis  No nausea or vomiting.     INTERVAL HISTORY    patient reports she is feeling better with less pain in his left foot. The patient reports improving erythema. she is not weight bearing yet on left leg. no fevers.     PAST MEDICAL & SURGICAL HISTORY:  Calcaneal fracture    Lung cancer    Lung nodule  multiple nodules pending biopsy    Pneumonia  May 2020 treated outpatient    COPD without exacerbation  no recent hospitalizations    Shoulder pain with history of repair of rotator cuff      Cervical cancer  s/p hysterectomy     H/O total hysterectomy      H/O cyst of breast  removed with biopsy     Osteochondroma of bone  left lower extremity s/p surgery at 15 years of age      MEDICATIONS  (STANDING):  clindamycin IVPB 600 milliGRAM(s) IV Intermittent every 8 hours  enoxaparin Injectable 40 milliGRAM(s) SubCutaneous daily  escitalopram 5 milliGRAM(s) Oral daily  folic acid 1 milliGRAM(s) Oral daily  gabapentin 100 milliGRAM(s) Oral two times a day  polyethylene glycol 3350 17 Gram(s) Oral daily  senna 2 Tablet(s) Oral at bedtime  vancomycin  IVPB 1000 milliGRAM(s) IV Intermittent every 12 hours        Allergies    amoxicillin (Unknown)  Keflex (Unknown)  penicillin (Unknown)  penicillins (Other)    Intolerances        SOCIAL HISTORY:    Smoking Status:former smoker  Alcohol:denied    Occupation:    FAMILY HISTORY:  FH: COPD (chronic obstructive pulmonary disease)          	                  Vital Signs Last 24 Hrs  T(C): 36.9 (27 Oct 2020 05:27), Max: 37.8 (26 Oct 2020 15:40)  T(F): 98.5 (27 Oct 2020 05:27), Max: 100 (26 Oct 2020 15:40)  HR: 102 (27 Oct 2020 05:27) (79 - 102)  BP: 98/55 (27 Oct 2020 05:27) (92/51 - 117/56)  BP(mean): --  RR: 18 (27 Oct 2020 05:27) (18 - 18)  SpO2: 98% (27 Oct 2020 05:27) (96% - 100%)  PHYSICAL EXAM:      Constitutional:Awake, alert, NAD, breathing comfortably    Eyes:anicteric    Neck:no adenopathy      Respiratory:decreased  BS left side    Cardiovascular:RRR norm S1S2    Gastrointestinal:soft, non-tender      Extremities:Left foot is swollen and erythematous on dorsum, fading per patient              LABS:                        12.0   6.29  )-----------( 193      ( 25 Oct 2020 21:05 )             36.3     10-25    134<L>  |  94<L>  |  20.0  ----------------------------<  109<H>  3.6   |  27.0  |  0.79    Ca    9.7      25 Oct 2020 21:05    TPro  7.4  /  Alb  4.1  /  TBili  0.5  /  DBili  x   /  AST  18  /  ALT  10  /  AlkPhos  110  10-25    PT/INR - ( 25 Oct 2020 21:05 )   PT: 12.4 sec;   INR: 1.07 ratio                  11.5                 136  | 25.0 | 23.0         4.44  >-----------< 189     ------------------------< 120                   34.1                 3.7  | 96   | 0.78                                         Ca 9.6   Mg 2.1   Ph 3.7         PTT - ( 25 Oct 2020 21:05 )  PTT:27.6 sec  Urinalysis Basic - ( 25 Oct 2020 20:50 )    Color: Yellow / Appearance: Clear / S.010 / pH: x  Gluc: x / Ketone: Negative  / Bili: Negative / Urobili: Negative mg/dL   Blood: x / Protein: 30 mg/dL / Nitrite: Negative   Leuk Esterase: Trace / RBC: 0-2 /HPF / WBC 0-2   Sq Epi: x / Non Sq Epi: Occasional / Bacteria: Occasional        RADIOLOGY & ADDITIONAL STUDIES:

## 2020-10-27 NOTE — PROGRESS NOTE ADULT - ASSESSMENT
65 years old female with PMH of COPD on home Oxygen, Lung Cancer on Chemotherapy, Left Calcaneal Fracture (treated non operatively) and Anxiety presented with left foot swelling, redness and fever.     1) Left Foot Cellulitis  - Follow cultures  - Continue Clindamycin and Vancomycin   - Will consider ID Consult if no improvement     2) Left Calcaneal Fracture  - Treated non operatively in August with Camboot + NWB  - Ortho Consult appreciated: no intervention   - JOHN    3) Lung Cancer (Stage IV NSCLC)  - s/p 1st cycle of chemotherapy  - Neurontin added for neuropathy   - Oncology following     4) COPD (on home oxygen)  - Oxygen PRN   - Continue Breo Ellipta (patient's own med)   - Albuterol PRN    5) Anxiety  - Continue Lexapro 5 mg daily  - Xanax 0.5 mg PRN    DVT Prophylaxis -- Lovenox SQ    Dispo: Home in 2-3 days

## 2020-10-27 NOTE — PROGRESS NOTE ADULT - ASSESSMENT
Imp:  64 yo female with Stage IV NSCLC, inoperable, post first cycle of chemotherapy last week, admitted with cellulitis  Improving on IV antibiotics, no orthopedic intervention planned.   Rec:  Monitor WBC post chemo  Antibiotics : on IV clindamycin, vancomycin.   on DVT prophylaxis

## 2020-10-28 LAB
ANION GAP SERPL CALC-SCNC: 12 MMOL/L — SIGNIFICANT CHANGE UP (ref 5–17)
BUN SERPL-MCNC: 35 MG/DL — HIGH (ref 8–20)
CALCIUM SERPL-MCNC: 8.6 MG/DL — SIGNIFICANT CHANGE UP (ref 8.6–10.2)
CHLORIDE SERPL-SCNC: 93 MMOL/L — LOW (ref 98–107)
CO2 SERPL-SCNC: 25 MMOL/L — SIGNIFICANT CHANGE UP (ref 22–29)
CREAT SERPL-MCNC: 1.37 MG/DL — HIGH (ref 0.5–1.3)
GLUCOSE SERPL-MCNC: 113 MG/DL — HIGH (ref 70–99)
MAGNESIUM SERPL-MCNC: 1.8 MG/DL — SIGNIFICANT CHANGE UP (ref 1.6–2.6)
POTASSIUM SERPL-MCNC: 3.9 MMOL/L — SIGNIFICANT CHANGE UP (ref 3.5–5.3)
POTASSIUM SERPL-SCNC: 3.9 MMOL/L — SIGNIFICANT CHANGE UP (ref 3.5–5.3)
SODIUM SERPL-SCNC: 130 MMOL/L — LOW (ref 135–145)
VANCOMYCIN TROUGH SERPL-MCNC: 22.1 UG/ML — HIGH (ref 10–20)

## 2020-10-28 PROCEDURE — 99223 1ST HOSP IP/OBS HIGH 75: CPT

## 2020-10-28 PROCEDURE — 99233 SBSQ HOSP IP/OBS HIGH 50: CPT

## 2020-10-28 RX ORDER — KETOROLAC TROMETHAMINE 30 MG/ML
10 SYRINGE (ML) INJECTION EVERY 8 HOURS
Refills: 0 | Status: DISCONTINUED | OUTPATIENT
Start: 2020-10-28 | End: 2020-10-28

## 2020-10-28 RX ORDER — SACCHAROMYCES BOULARDII 250 MG
250 POWDER IN PACKET (EA) ORAL
Refills: 0 | Status: DISCONTINUED | OUTPATIENT
Start: 2020-10-28 | End: 2020-10-28

## 2020-10-28 RX ORDER — SODIUM CHLORIDE 9 MG/ML
1000 INJECTION INTRAMUSCULAR; INTRAVENOUS; SUBCUTANEOUS
Refills: 0 | Status: DISCONTINUED | OUTPATIENT
Start: 2020-10-28 | End: 2020-10-30

## 2020-10-28 RX ORDER — TRAMADOL HYDROCHLORIDE 50 MG/1
25 TABLET ORAL EVERY 6 HOURS
Refills: 0 | Status: DISCONTINUED | OUTPATIENT
Start: 2020-10-28 | End: 2020-11-01

## 2020-10-28 RX ORDER — ESCITALOPRAM OXALATE 10 MG/1
5 TABLET, FILM COATED ORAL ONCE
Refills: 0 | Status: COMPLETED | OUTPATIENT
Start: 2020-10-28 | End: 2020-10-28

## 2020-10-28 RX ADMIN — Medication 250 MILLIGRAM(S): at 01:51

## 2020-10-28 RX ADMIN — Medication 100 MILLIGRAM(S): at 04:54

## 2020-10-28 RX ADMIN — SENNA PLUS 2 TABLET(S): 8.6 TABLET ORAL at 21:20

## 2020-10-28 RX ADMIN — ESCITALOPRAM OXALATE 5 MILLIGRAM(S): 10 TABLET, FILM COATED ORAL at 21:20

## 2020-10-28 RX ADMIN — GABAPENTIN 100 MILLIGRAM(S): 400 CAPSULE ORAL at 16:54

## 2020-10-28 RX ADMIN — Medication 0.5 MILLIGRAM(S): at 13:33

## 2020-10-28 RX ADMIN — GABAPENTIN 100 MILLIGRAM(S): 400 CAPSULE ORAL at 04:55

## 2020-10-28 RX ADMIN — Medication 100 MILLIGRAM(S): at 15:26

## 2020-10-28 RX ADMIN — ENOXAPARIN SODIUM 40 MILLIGRAM(S): 100 INJECTION SUBCUTANEOUS at 09:28

## 2020-10-28 RX ADMIN — POLYETHYLENE GLYCOL 3350 17 GRAM(S): 17 POWDER, FOR SOLUTION ORAL at 09:28

## 2020-10-28 RX ADMIN — SODIUM CHLORIDE 125 MILLILITER(S): 9 INJECTION INTRAMUSCULAR; INTRAVENOUS; SUBCUTANEOUS at 12:05

## 2020-10-28 RX ADMIN — Medication 100 MILLIGRAM(S): at 21:26

## 2020-10-28 RX ADMIN — Medication 1 MILLIGRAM(S): at 09:27

## 2020-10-28 RX ADMIN — ESCITALOPRAM OXALATE 5 MILLIGRAM(S): 10 TABLET, FILM COATED ORAL at 12:05

## 2020-10-28 RX ADMIN — TRAMADOL HYDROCHLORIDE 25 MILLIGRAM(S): 50 TABLET ORAL at 21:20

## 2020-10-28 RX ADMIN — Medication 650 MILLIGRAM(S): at 04:54

## 2020-10-28 NOTE — PROGRESS NOTE ADULT - ASSESSMENT
65 years old female with PMH of COPD on home Oxygen, Lung Cancer on Chemotherapy, Left Calcaneal Fracture (treated non operatively) and Anxiety presented with left foot swelling, redness and fever.     1) Left Foot Cellulitis  - Blood cultures negative   - Continue Vancomycin   - Gave Clindamycin for 3 days for antitoxic effect  - ID Consult     2) Left Calcaneal Fracture  - Treated non operatively in August with Camboot + NWB  - Ortho Consult appreciated: no intervention   - WBAT     3) Lung Cancer (Stage IV NSCLC)  - s/p 1st cycle of chemotherapy  - Neurontin added for neuropathy   - Oncology following     4) COPD (on home oxygen)  - Oxygen PRN   - Continue Breo Ellipta (patient's own med)   - Albuterol PRN    5) Anxiety  - Increase Lexapro to 10 mg daily  - Xanax 0.5 mg PRN    6) EMILIANO + Hyponatremia  - Likely due to dehydration  - NS at 125 ml / hour  - Avoid nephrotoxic medications  - Monitor renal function     DVT Prophylaxis -- Lovenox SQ    Dispo: Home in 2-3 days

## 2020-10-28 NOTE — PHYSICAL THERAPY INITIAL EVALUATION ADULT - PERTINENT HX OF CURRENT PROBLEM, REHAB EVAL
In August pt.  had a fall resulting in a left calcaneal fracture, started on PT and was walking on eleazar boot, pt. noticed worsening redness and swelling of left foot

## 2020-10-28 NOTE — CONSULT NOTE ADULT - ASSESSMENT
65y  Female with h/o COPD on home O2, anxiety, recent calcaneal fracture, recent dx lung ca on chemo, last chemotherapy was 10/20 at [facility]. Patient presented tot he ER this visit with c/o worsening L foot swelling and erythema. Patient states diagnosed with Lung Ca over the summer and just recently started on chemotherapy last week. In August she also had a fall resulting in a calcaneal fracture. She didn't require surgery, but it was placed in a cast. This past week cast removed and patient started on PT and was walking on eleazar boot. On Friday she noticed worsening redness and swelling of left foot. This continued to get worse and she started getting low grade fevers associated with chills. Patient decided to come to the ER. In the ER patient was afebrile, no leukocytosis. Patient as started on Clindamycin and Vancomycin and erythema improved.       Left foot cellulitis  Lung cancer on chemotherapy  Immunosuppressed due to chemotherapy   Former smoker   PCN allergy   Keflex allergy   EMILIANO    - Blood cultures no growth   - COVID 19 PCR negative    - Foot xray reporting cellulitis   - Monitor Cr  - D/C Vancomycin  - Re-start Clindamycin 600mg IV q 8hours  - Plan for treatment till 11/1/20  - Trend Fever  - Trend Leukocytosis      Will Follow

## 2020-10-28 NOTE — PROGRESS NOTE ADULT - SUBJECTIVE AND OBJECTIVE BOX
HPI: Patient is a 65y Female seen on consultation for the evaluation and management of lung cancer; [facility]  Pt has Stage IV lung cancer  in chest, s/p attempt at resection 9/4/2020; found to be unresectable.  Received first tx last week with Carbopl based regimin with Keytruda per pt report.  Also had broken left foot prior.  Came to ER with swelling and erythema in left foot, assoc with fever to 100 and chills.  Now on antibiotics with improvement in swelling and erythema.  Dyspnea at baseline; no cough, no hemoptysis  No nausea or vomiting.     INTERVAL HISTORY    patient reports she is feeling better with less pain in his left foot. reports increased ankle swelling. no fevers/ The patient reports improving erythema. she is not weight bearing  on left leg.     PAST MEDICAL & SURGICAL HISTORY:  Calcaneal fracture    Lung cancer    Lung nodule  multiple nodules pending biopsy    Pneumonia  May 2020 treated outpatient    COPD without exacerbation  no recent hospitalizations    Shoulder pain with history of repair of rotator cuff  2015    Cervical cancer  s/p hysterectomy 2005    H/O total hysterectomy  2005    H/O cyst of breast  removed with biopsy 1980    Osteochondroma of bone  left lower extremity s/p surgery at 15 years of age      MEDICATIONS  (STANDING):  clindamycin IVPB 600 milliGRAM(s) IV Intermittent every 8 hours  enoxaparin Injectable 40 milliGRAM(s) SubCutaneous daily  escitalopram 5 milliGRAM(s) Oral daily  folic acid 1 milliGRAM(s) Oral daily  gabapentin 100 milliGRAM(s) Oral two times a day  polyethylene glycol 3350 17 Gram(s) Oral daily  senna 2 Tablet(s) Oral at bedtime  vancomycin  IVPB 1000 milliGRAM(s) IV Intermittent every 12 hours          Allergies    amoxicillin (Unknown)  Keflex (Unknown)  penicillin (Unknown)  penicillins (Other)    Intolerances              	          Vital Signs Last 24 Hrs  T(C): 36.5 (28 Oct 2020 08:14), Max: 37 (27 Oct 2020 15:27)  T(F): 97.7 (28 Oct 2020 08:14), Max: 98.6 (27 Oct 2020 15:27)  HR: 83 (28 Oct 2020 08:14) (83 - 110)  BP: 105/65 (28 Oct 2020 08:14) (94/64 - 160/65)  BP(mean): --  RR: 18 (28 Oct 2020 08:14) (18 - 20)  SpO2: 93% (28 Oct 2020 08:14) (93% - 100%)  PHYSICAL EXAM:      Constitutional:Awake, alert, NAD, breathing comfortably    Eyes:anicteric    Neck:no adenopathy      Respiratory:decreased  BS left side    Cardiovascular:RRR norm S1S2    Gastrointestinal:soft, non-tender      Extremities:Left foot mildly swollen and erythematous on dorsum, unchanged from yesterday                    x                    130  | 25.0 | 35.0         x     >-----------< x       ------------------------< 113                   x                    3.9  | 93   | 1.37                                         Ca 8.6   Mg 1.8   Ph x            RADIOLOGY & ADDITIONAL STUDIES:

## 2020-10-28 NOTE — PHYSICAL THERAPY INITIAL EVALUATION ADULT - LEVEL OF INDEPENDENCE: STAIR NEGOTIATION, REHAB EVAL
NA/pt. declined at present time, due to c/o of the left foot swelling and discomfort, benefits explained

## 2020-10-28 NOTE — PHYSICAL THERAPY INITIAL EVALUATION ADULT - ADDITIONAL COMMENTS
as per pt. : resides in private house, with 4 steps to enter (+) rail, in the past ambulation with knee scooter, (+) left Cam boot due to Fx as noted, just recently started ambulation with RW, (+) chair lift to the second floor, Family available to assist as needed upon D/C home

## 2020-10-28 NOTE — CONSULT NOTE ADULT - SUBJECTIVE AND OBJECTIVE BOX
[facility]  INFECTIOUS DISEASES AND INTERNAL MEDICINE at [place]  =======================================================  Timothy Shabazz MD  Diplomates American Board of Internal Medicine and Infectious Diseases  Tel: [phone]      Fax: [phone]  =======================================================      [id number]  TAMAR LIM    CC: Patient is a 65y old  Female who presents with a chief complaint of cellulitis (28 Oct 2020 11:52)      65y  Female with h/o COPD on home O2, anxiety, recent calcaneal fracture, recent dx lung ca on chemo, last chemotherapy was 10/20 at [facility] [place]. Patient presented tot he ER this visit with c/o worsening L foot swelling and erythema. Patient states diagnosed with Lung Ca over the summer and just recently started on chemotherapy last week. In August she also had a fall resulting in a calcaneal fracture. She didn't require surgery, but it was placed in a cast. This past week cast removed and patient started on PT and was walking on eleazar boot. On Friday she noticed worsening redness and swelling of left foot. This continued to get worse and she started getting low grade fevers associated with chills. Patient decided to come to the ER. In the ER patient was afebrile, no leukocytosis. Patient as started on Clindamycin and Vancomycin and erythema improved. ID input now requested.       Past Medical & Surgical Hx:  Calcaneal fracture  Lung cancer  Lung nodule  multiple nodules pending biopsy  Pneumonia  May 2020 treated outpatient  COPD without exacerbation no recent hospitalizations  Shoulder pain with history of repair of rotator cuff 2015  Cervical cancer  s/p hysterectomy 2005  H/O total hysterectomy 2005  H/O cyst of breast removed with biopsy 1980  Osteochondroma of bone  left lower extremity s/p surgery at 15 years of age      Social Hx:  Former smoker      FAMILY HISTORY:  FH: COPD (chronic obstructive pulmonary disease) - Father  CAD - Father       Allergies  amoxicillin (Unknown)  Keflex (Unknown)  penicillin (Unknown)  penicillins (Other)       REVIEW OF SYSTEMS:  CONSTITUTIONAL:  No Fever or chills  HEENT:  No diplopia or blurred vision.  No earache, sore throat or runny nose.  CARDIOVASCULAR:  No pressure, squeezing, strangling, tightness, heaviness or aching about the chest, neck, axilla or epigastrium.  RESPIRATORY:  No cough, shortness of breath  GASTROINTESTINAL:  No nausea, vomiting or diarrhea.  GENITOURINARY:  No dysuria, frequency or urgency. No Blood in urine  MUSCULOSKELETAL:  no joint aches, no muscle pain  SKIN:  Left foot redness improved   NEUROLOGIC:  No Headaches, seizures or weakness.  PSYCHIATRIC:  No disorder of thought or mood.  ENDOCRINE:  No heat or cold intolerance  HEMATOLOGICAL:  No easy bruising or bleeding.       Physical Exam:  GEN: NAD, pleasant  HEENT: normocephalic and atraumatic. EOMI. PERRL.  Anicteric  NECK: Supple.   LUNGS: Clear to auscultation.  HEART: Regular rate and rhythm  ABDOMEN: Soft, nontender, and nondistended.  Positive bowel sounds.    : No CVA tenderness  EXTREMITIES: Without any edema.  MSK: No joint swelling  NEUROLOGIC:  No Focal Deficits  PSYCHIATRIC: Appropriate affect .  SKIN: Left foot erythema       Vitals:  T(F): 97.7 (28 Oct 2020 08:14), Max: 98.6 (27 Oct 2020 15:27)  HR: 83 (28 Oct 2020 08:14)  BP: 105/65 (28 Oct 2020 08:14)  RR: 18 (28 Oct 2020 08:14)  SpO2: 93% (28 Oct 2020 08:14) (93% - 98%)  temp max in last 48H T(F): , Max: 100 (10-26-20 @ 15:40)      Current Antibiotics:      Other medications:  enoxaparin Injectable 40 milliGRAM(s) SubCutaneous daily  escitalopram 5 milliGRAM(s) Oral daily  folic acid 1 milliGRAM(s) Oral daily  gabapentin 100 milliGRAM(s) Oral two times a day  polyethylene glycol 3350 17 Gram(s) Oral daily  senna 2 Tablet(s) Oral at bedtime  sodium chloride 0.9%. 1000 milliLiter(s) IV Continuous <Continuous>      Labs:   10-28    130<L>  |  93<L>  |  35.0<H>  ----------------------------<  113<H>  3.9   |  25.0  |  1.37<H>    Ca    8.6      28 Oct 2020 07:05  Mg     1.8     10-28      Culture - Urine (collected 10-26-20 @ 01:45)  Source: .Urine Clean Catch (Midstream)  Final Report (10-26-20 @ 22:18):    <10,000 CFU/mL Normal Urogenital Mercedes    Culture - Blood (collected 10-25-20 @ 21:07)  Source: .Blood Blood-Peripheral    Culture - Blood (collected 10-25-20 @ 21:05)  Source: .Blood Blood-Peripheral      WBC Count: 4.44 K/uL (10-26-20 @ 09:14)  WBC Count: 6.29 K/uL (10-25-20 @ 21:05)    Creatinine, Serum: 1.37 mg/dL (10-28-20 @ 07:05)  Creatinine, Serum: 0.78 mg/dL (10-26-20 @ 09:14)  Creatinine, Serum: 0.79 mg/dL (10-25-20 @ 21:05)    COVID-19 IgG Antibody Index: <0.10 Index (10-26-20 @ 16:49)  COVID-19 IgG Antibody Interpretation: Negative (10-26-20 @ 16:49)  COVID-19 PCR: NotDetec (10-25-20 @ 21:54)      < from: Xray Chest 2 Views PA/Lat (10.25.20 @ 22:06) >   EXAM:  XR CHEST PA LAT 2V                          PROCEDURE DATE:  10/25/2020      INTERPRETATION:  TECHNIQUE: 2 views of the chest.    COMPARISON: 7/23/2020    CLINICAL HISTORY: Sepsis    FINDINGS:    Frontal and lateral views of the chest demonstrates 2 cm left upper lobe pulmonary nodule. This appears unchanged from the prior study. Moderate chronic interstitial lung changes. Consider CT. The cardiomediastinal silhouette is enlarged. No acute osseous abnormalities.    IMPRESSION:    2 cm left upper lobe pulmonary nodule. Chest CT is recommended. Cardiomegaly.    < end of copied text >        < from: Xray Foot AP + Lateral + Oblique, Left (10.25.20 @ 22:05) >  EXAM:  FOOT-LEFT                          PROCEDURE DATE:  10/25/2020      INTERPRETATION:  Procedure: Left foot  x-ray    History: Cellulitis    Comment:  Frontal, oblique and lateral views of the left foot  demonstrate osteopenic bones. There is some calcaneal deformity consistent with healing calcaneal fracture. No other fractures seen and there is no dislocation or significant subluxation. There are some degenerative changes. There are no lytic or blastic lesions.  There is soft tissueswelling throughout the foot, abdominal in the forefoot. There are no radio-opaque foreign bodies identified.The remainder of the study is unremarkable.    Impression:  1.  Soft tissue swelling and osteopenic bones. Healing calcaneal fracture. No acute osseous abnormality seen    < end of copied text >

## 2020-10-28 NOTE — PROGRESS NOTE ADULT - ASSESSMENT
Imp:  66 yo female with Stage IV NSCLC, inoperable, post first cycle of chemotherapy last week, admitted with cellulitis  Improving on IV antibiotics, no orthopedic intervention planned for calcaneal fracture.   Rec:  Monitor WBC post chemo. will order CBC daily  Antibiotics : on IV clindamycin, vancomycin.   on DVT prophylaxis

## 2020-10-28 NOTE — PHYSICAL THERAPY INITIAL EVALUATION ADULT - CRITERIA FOR SKILLED THERAPEUTIC INTERVENTIONS
functional limitations in following categories/anticipated discharge recommendation/anticipated equipment needs at discharge/predicted duration of therapy intervention/impairments found/therapy frequency/risk reduction/prevention/rehab potential

## 2020-10-28 NOTE — PHYSICAL THERAPY INITIAL EVALUATION ADULT - DIAGNOSIS, PT EVAL
Impaired Functional Mobility due to current Left Foot Cellulitis and recent  left calcaneal fracture.

## 2020-10-28 NOTE — PROGRESS NOTE ADULT - SUBJECTIVE AND OBJECTIVE BOX
Cellulitis of left lower extremity    HPI:  65F hx COPD on home O2, anxiety, recent calcaneal fx, recent dx lung ca on chemo presents with worsening L foot swelling and erythema. Patient states diagnosed with Lung Ca over the summer and just recently started on chemotherapy last week (unclear exactly what, goes to [facility] [place]). In August she also had a fall resulting in a calcaneal fracture. She didn't require surgery, but it was placed in a cast. This past week cast removed and patient started on PT and was walking on eleazar boot. Two days ago she noticed worsening redness and swelling of left foot. This continued to get worse and she started getting low grade fevers. Fevers and pain continued so she came to ED.     In ED, p tmax 100, p- 102, bp 118/81, RR- 18 and spo2 96% on RA. Pt received clindamycin and vancomycin. Denies cough, chest pain, sob, nausea, vomiting, diarrhea or dysuria. (26 Oct 2020 02:16)    Interval History:  Patient was seen and examined at bedside. Was having leg cramps/pain again last night, a dose of Toradol was given. Feeling better now.   Left foot swelling is slightly better. Redness is improving.    Denies chest pain, palpitations, shortness of breath, headache, dizziness, visual symptoms, nausea, vomiting or abdominal pain.  Episodes of anxiety at times.     ROS:  As per interval history otherwise unremarkable.    PHYSICAL EXAM:  Vital Signs   T(C): 36.5 (28 Oct 2020 08:14), Max: 37 (27 Oct 2020 15:27)  T(F): 97.7 (28 Oct 2020 08:14), Max: 98.6 (27 Oct 2020 15:27)  HR: 83 (28 Oct 2020 08:14) (83 - 110)  BP: 105/65 (28 Oct 2020 08:14) (94/64 - 160/65)  RR: 18 (28 Oct 2020 08:14) (18 - 20)  SpO2: 93% (28 Oct 2020 08:14) (93% - 98%)  General: Elderly female sitting in bed comfortably. No acute distress  HEENT: EOMI. Clear conjunctivae. Moist mucus membrane  Neck: Supple.   Chest: CTA bilaterally - no wheezing, rales or rhonchi.   Heart: Normal S1 & S2 with RRR.   Abdomen: Soft. Non-tender. Non-distended. + BS  Ext: No pedal edema. No calf tenderness. Left Foot: Swollen, erythematous (improving), slightly warm on dorsal aspect. No tenderness. ROM limited.   Neuro: AAO x 3. No focal deficit. No speech disorder.  Skin: Warm and Dry  Psychiatry: Normal mood and affect    MEDICATIONS  (STANDING):  enoxaparin Injectable 40 milliGRAM(s) SubCutaneous daily  escitalopram 5 milliGRAM(s) Oral daily  escitalopram 5 milliGRAM(s) Oral once  folic acid 1 milliGRAM(s) Oral daily  gabapentin 100 milliGRAM(s) Oral two times a day  polyethylene glycol 3350 17 Gram(s) Oral daily  senna 2 Tablet(s) Oral at bedtime  sodium chloride 0.9%. 1000 milliLiter(s) (125 mL/Hr) IV Continuous <Continuous>  vancomycin  IVPB 1000 milliGRAM(s) IV Intermittent every 12 hours    MEDICATIONS  (PRN):  acetaminophen   Tablet .. 650 milliGRAM(s) Oral every 6 hours PRN Temp greater or equal to 38C (100.4F), Mild Pain (1 - 3), Moderate Pain (4 - 6)  ALBUTerol    90 MICROgram(s) HFA Inhaler 2 Puff(s) Inhalation every 6 hours PRN Shortness of Breath and/or Wheezing  ALPRAZolam 0.5 milliGRAM(s) Oral daily PRN anxiety  traMADol 25 milliGRAM(s) Oral every 6 hours PRN Severe Pain (7 - 10)    LABS:    10-28    130<L>  |  93<L>  |  35.0<H>  ----------------------------<  113<H>  3.9   |  25.0  |  1.37<H>    Ca    8.6      28 Oct 2020 07:05  Mg     1.8     10-28    Blood Culture: 10-26 @ 01:45  Organism --  Gram Stain Blood -- Gram Stain --  Specimen Source .Urine Clean Catch (Midstream)  Culture-Blood --    10-25 @ 21:07  Organism --  Gram Stain Blood -- Gram Stain --  Specimen Source .Blood Blood-Peripheral  Culture-Blood --    10-25 @ 21:05  Organism --  Gram Stain Blood -- Gram Stain --  Specimen Source .Blood Blood-Peripheral  Culture-Blood --    RADIOLOGY & ADDITIONAL STUDIES:  Reviewed

## 2020-10-28 NOTE — PHYSICAL THERAPY INITIAL EVALUATION ADULT - ACTIVE RANGE OF MOTION EXAMINATION, REHAB EVAL
Right LE Active ROM was WFL (within functional limits)/left hip flexion, knee flexion/extension at least 3-/5 resistance not applied,  ankle joint NA/Right UE Active ROM was WFL (within functional limits)

## 2020-10-29 ENCOUNTER — TRANSCRIPTION ENCOUNTER (OUTPATIENT)
Age: 65
End: 2020-10-29

## 2020-10-29 LAB
ANION GAP SERPL CALC-SCNC: 11 MMOL/L — SIGNIFICANT CHANGE UP (ref 5–17)
BUN SERPL-MCNC: 24 MG/DL — HIGH (ref 8–20)
CALCIUM SERPL-MCNC: 9.3 MG/DL — SIGNIFICANT CHANGE UP (ref 8.6–10.2)
CHLORIDE SERPL-SCNC: 99 MMOL/L — SIGNIFICANT CHANGE UP (ref 98–107)
CO2 SERPL-SCNC: 27 MMOL/L — SIGNIFICANT CHANGE UP (ref 22–29)
CREAT SERPL-MCNC: 1.09 MG/DL — SIGNIFICANT CHANGE UP (ref 0.5–1.3)
GLUCOSE SERPL-MCNC: 102 MG/DL — HIGH (ref 70–99)
HCT VFR BLD CALC: 30.3 % — LOW (ref 34.5–45)
HGB BLD-MCNC: 9.9 G/DL — LOW (ref 11.5–15.5)
MAGNESIUM SERPL-MCNC: 2.1 MG/DL — SIGNIFICANT CHANGE UP (ref 1.8–2.6)
MCHC RBC-ENTMCNC: 31.1 PG — SIGNIFICANT CHANGE UP (ref 27–34)
MCHC RBC-ENTMCNC: 32.7 GM/DL — SIGNIFICANT CHANGE UP (ref 32–36)
MCV RBC AUTO: 95.3 FL — SIGNIFICANT CHANGE UP (ref 80–100)
PHOSPHATE SERPL-MCNC: 3.5 MG/DL — SIGNIFICANT CHANGE UP (ref 2.4–4.7)
PLATELET # BLD AUTO: 163 K/UL — SIGNIFICANT CHANGE UP (ref 150–400)
POTASSIUM SERPL-MCNC: 4.4 MMOL/L — SIGNIFICANT CHANGE UP (ref 3.5–5.3)
POTASSIUM SERPL-SCNC: 4.4 MMOL/L — SIGNIFICANT CHANGE UP (ref 3.5–5.3)
RBC # BLD: 3.18 M/UL — LOW (ref 3.8–5.2)
RBC # FLD: 12.2 % — SIGNIFICANT CHANGE UP (ref 10.3–14.5)
SODIUM SERPL-SCNC: 137 MMOL/L — SIGNIFICANT CHANGE UP (ref 135–145)
WBC # BLD: 3.41 K/UL — LOW (ref 3.8–10.5)
WBC # FLD AUTO: 3.41 K/UL — LOW (ref 3.8–10.5)

## 2020-10-29 PROCEDURE — 99232 SBSQ HOSP IP/OBS MODERATE 35: CPT

## 2020-10-29 RX ORDER — ESCITALOPRAM OXALATE 10 MG/1
2 TABLET, FILM COATED ORAL
Qty: 0 | Refills: 0 | DISCHARGE

## 2020-10-29 RX ORDER — ESCITALOPRAM OXALATE 10 MG/1
0 TABLET, FILM COATED ORAL
Qty: 0 | Refills: 0 | DISCHARGE

## 2020-10-29 RX ORDER — ESCITALOPRAM OXALATE 10 MG/1
10 TABLET, FILM COATED ORAL AT BEDTIME
Refills: 0 | Status: DISCONTINUED | OUTPATIENT
Start: 2020-10-29 | End: 2020-11-01

## 2020-10-29 RX ORDER — ACETAMINOPHEN 500 MG
2 TABLET ORAL
Qty: 0 | Refills: 0 | DISCHARGE
Start: 2020-10-29

## 2020-10-29 RX ORDER — GABAPENTIN 400 MG/1
1 CAPSULE ORAL
Qty: 60 | Refills: 0
Start: 2020-10-29 | End: 2020-11-27

## 2020-10-29 RX ORDER — FOLIC ACID 0.8 MG
1 TABLET ORAL
Qty: 0 | Refills: 0 | DISCHARGE
Start: 2020-10-29

## 2020-10-29 RX ADMIN — ESCITALOPRAM OXALATE 10 MILLIGRAM(S): 10 TABLET, FILM COATED ORAL at 21:37

## 2020-10-29 RX ADMIN — TRAMADOL HYDROCHLORIDE 25 MILLIGRAM(S): 50 TABLET ORAL at 23:13

## 2020-10-29 RX ADMIN — POLYETHYLENE GLYCOL 3350 17 GRAM(S): 17 POWDER, FOR SOLUTION ORAL at 08:40

## 2020-10-29 RX ADMIN — Medication 0.5 MILLIGRAM(S): at 14:14

## 2020-10-29 RX ADMIN — GABAPENTIN 100 MILLIGRAM(S): 400 CAPSULE ORAL at 05:25

## 2020-10-29 RX ADMIN — Medication 100 MILLIGRAM(S): at 05:25

## 2020-10-29 RX ADMIN — TRAMADOL HYDROCHLORIDE 25 MILLIGRAM(S): 50 TABLET ORAL at 16:15

## 2020-10-29 RX ADMIN — ENOXAPARIN SODIUM 40 MILLIGRAM(S): 100 INJECTION SUBCUTANEOUS at 12:21

## 2020-10-29 RX ADMIN — TRAMADOL HYDROCHLORIDE 25 MILLIGRAM(S): 50 TABLET ORAL at 09:45

## 2020-10-29 RX ADMIN — Medication 1 MILLIGRAM(S): at 08:40

## 2020-10-29 RX ADMIN — GABAPENTIN 100 MILLIGRAM(S): 400 CAPSULE ORAL at 17:34

## 2020-10-29 RX ADMIN — SODIUM CHLORIDE 100 MILLILITER(S): 9 INJECTION INTRAMUSCULAR; INTRAVENOUS; SUBCUTANEOUS at 16:00

## 2020-10-29 RX ADMIN — SENNA PLUS 2 TABLET(S): 8.6 TABLET ORAL at 21:37

## 2020-10-29 RX ADMIN — Medication 100 MILLIGRAM(S): at 15:59

## 2020-10-29 RX ADMIN — TRAMADOL HYDROCHLORIDE 25 MILLIGRAM(S): 50 TABLET ORAL at 08:45

## 2020-10-29 RX ADMIN — Medication 100 MILLIGRAM(S): at 21:37

## 2020-10-29 NOTE — PROGRESS NOTE ADULT - ASSESSMENT
65y  Female with h/o COPD on home O2, anxiety, recent calcaneal fracture, recent dx lung ca on chemo, last chemotherapy was 10/20 at [facility] [place]. Patient presented tot he ER this visit with c/o worsening L foot swelling and erythema. Patient states diagnosed with Lung Ca over the summer and just recently started on chemotherapy last week. In August she also had a fall resulting in a calcaneal fracture. She didn't require surgery, but it was placed in a cast. This past week cast removed and patient started on PT and was walking on eleazar boot. On Friday she noticed worsening redness and swelling of left foot. This continued to get worse and she started getting low grade fevers associated with chills. Patient decided to come to the ER. In the ER patient was afebrile, no leukocytosis. Patient as started on Clindamycin and Vancomycin and erythema improved.       Left foot cellulitis  Lung cancer on chemotherapy  Immunosuppressed due to chemotherapy   Former smoker   PCN allergy   Keflex allergy   EMILIANO    - Blood cultures no growth   - COVID 19 PCR negative    - Foot xray reporting cellulitis   - Monitor Cr  - Continue Clindamycin 600mg IV q 8hours  - Plan for treatment till 11/1/20  - Trend Fever  - Trend Leukocytosis      Will Follow

## 2020-10-29 NOTE — DISCHARGE NOTE PROVIDER - HOSPITAL COURSE
65 years old female with PMH of COPD on home Oxygen, Lung Cancer on Chemotherapy, Left Calcaneal Fracture (treated non operatively) and Anxiety presented with left foot swelling, redness and fever.     1) Left Foot Cellulitis  - Blood cultures negative   - Vancomycin - discontinued  - Continue Clindamycin  - ID Consult- continue Clinda until 11/1/2020     2) Left Calcaneal Fracture  - Treated non operatively in August with [place] + NWB  - Ortho Consult appreciated: no intervention   - WBAT     3) Lung Cancer (Stage IV NSCLC)  - s/p 1st cycle of chemotherapy  - Neurontin added for neuropathy   - Oncology following     4) COPD (on home oxygen)  - Oxygen PRN   - Continue Breo Ellipta (patient's own med)   - Albuterol PRN    5) Anxiety  - Increase Lexapro to 10 mg daily  - Xanax 0.5 mg PRN    6) EMILIANO + Hyponatremia- resolved, Na+ and creatinine normalized  - Likely due to dehydration  - NS at 125 ml / hour  - Avoid nephrotoxic medications  - Monitor renal function - improving     Vital Signs Last 24 Hrs  T(C): 36.6 (29 Oct 2020 08:07), Max: 36.8 (28 Oct 2020 16:17)  T(F): 97.8 (29 Oct 2020 08:07), Max: 98.2 (28 Oct 2020 16:17)  HR: 85 (29 Oct 2020 08:07) (79 - 85)  BP: 111/59 (29 Oct 2020 08:07) (111/59 - 113/73)  BP(mean): --  RR: 18 (29 Oct 2020 08:07) (18 - 18)  SpO2: 96% (28 Oct 2020 23:38) (96% - 98%)    10-29    137  |  99  |  24.0<H>  ----------------------------<  102<H>  4.4   |  27.0  |  1.09    Ca    9.3      29 Oct 2020 07:28  Phos  3.5     10-29  Mg     2.1     10-29                          9.9    3.41  )-----------( 163      ( 29 Oct 2020 07:28 )             30.3     < from: US Duplex Venous Lower Ext Ltd, Left (10.25.20 @ 21:38) >    No evidence of a deep venous thrombosis in the left lower extremity.    < from: Xray Foot AP + Lateral + Oblique, Left (10.25.20 @ 22:05) >    Impression:  1.  Soft tissue swelling and osteopenic bones. Healing calcaneal fracture. No acute osseous abnormality seen               65 years old female with PMH of COPD on home Oxygen, Lung Cancer on Chemotherapy, Left Calcaneal Fracture (treated non operatively) and Anxiety presented with left foot swelling, redness and fever. Admitted with Cellulitis of Left Foot. Given her immunocompromised status and allergy to PCN, she was started on Vancomycin and Clindamycin. ID was consulted and they recommended to continue Clindamycin and D/C Vancomycin. She was seen by Orthopedics for Old Left Calcaneal Fracture and no intervention was recommended.   Cellulitis is resolving and she is feeling much better. ID recommended antibiotics till 11/1/20. During hospitalization, she developed hyponatremia and acute kidney injury likely due to dehydration. She was encouraged for oral intake and IVF were started. Hyponatremia and EMILIANO resolved. Oncology followed her during her stay in the hospital. She is stable for discharge.     Time spent: 40 minutes 65 years old female with PMH of COPD on home Oxygen, Lung Cancer on Chemotherapy, Left Calcaneal Fracture (treated non operatively) and Anxiety presented with left foot swelling, redness and fever. Admitted with Cellulitis of Left Foot. Given her immunocompromised status and allergy to PCN, she was started on Vancomycin and Clindamycin. ID was consulted and they recommended to continue Clindamycin and D/C Vancomycin. She was seen by Orthopedics for Old Left Calcaneal Fracture and no intervention was recommended.     Cellulitis is resolving and she is feeling much better. ID recommended antibiotics till 11/1/20. During hospitalization, she developed hyponatremia and acute kidney injury likely due to dehydration. She was encouraged for oral intake and IVF were started. Hyponatremia and EMILIANO resolved. Oncology followed her during her stay in the hospital. She is stable for discharge.     Vital Signs Last 24 Hrs  T(C): 36.6 (01 Nov 2020 07:48), Max: 36.7 (31 Oct 2020 23:26)  T(F): 97.8 (01 Nov 2020 07:48), Max: 98 (31 Oct 2020 23:26)  HR: 88 (01 Nov 2020 07:48) (64 - 88)  BP: 128/73 (01 Nov 2020 07:48) (102/68 - 158/75)  BP(mean): --  RR: 19 (01 Nov 2020 07:48) (18 - 19)  SpO2: 94% (31 Oct 2020 16:52) (94% - 94%)    PHYSICAL EXAM:  General: Well developed; well nourished; in no acute distress  Eyes: PERRLA, EOMI; conjunctiva and sclera clear  Head: Normocephalic; atraumatic  ENMT: No nasal discharge; airway clear  Neck: Supple; non tender; no masses  Respiratory: No wheezes, rales or rhonchi  Cardiovascular: Regular rate and rhythm. S1 and S2 Normal; No murmurs, gallops or rubs  Gastrointestinal: Soft non-tender non-distended; Normal bowel sounds  Genitourinary: No costovertebral angle tenderness  Extremities: Normal range of motion, No clubbing, cyanosis; swelling of the left foot with erythema over the dorsum of her foot, increased warmth as well  Vascular: Peripheral pulses palpable 2+ bilaterally  Neurological: Alert and oriented x4  Skin: Warm and dry. No acute rash  Musculoskeletal: Normal gait, tone, without deformities  Psychiatric: Cooperative and appropriate    discharge time 40 minutes

## 2020-10-29 NOTE — PROGRESS NOTE ADULT - SUBJECTIVE AND OBJECTIVE BOX
[facility]  INFECTIOUS DISEASES AND INTERNAL MEDICINE at [place]  =======================================================  Timothy Shabazz MD  Diplomates American Board of Internal Medicine and Infectious Diseases  Tel: [phone]      Fax: [phone]  =======================================================    TAMAR LIM [id number]    Follow up: Left foot cellulitis    No fever or chills    No complaints       Allergies:  amoxicillin (Unknown)  Keflex (Unknown)  penicillin (Unknown)  penicillins (Other)      REVIEW OF SYSTEMS:  CONSTITUTIONAL:  No Fever or chills  HEENT:  No diplopia or blurred vision.  No earache, sore throat or runny nose.  CARDIOVASCULAR:  No pressure, squeezing, strangling, tightness, heaviness or aching about the chest, neck, axilla or epigastrium.  RESPIRATORY:  No cough, shortness of breath  GASTROINTESTINAL:  No nausea, vomiting or diarrhea.  GENITOURINARY:  No dysuria, frequency or urgency. No Blood in urine  MUSCULOSKELETAL:  no joint aches, no muscle pain  SKIN:  Left foot redness improved   NEUROLOGIC:  No Headaches, seizures or weakness.  PSYCHIATRIC:  No disorder of thought or mood.  ENDOCRINE:  No heat or cold intolerance  HEMATOLOGICAL:  No easy bruising or bleeding.       Physical Exam:  GEN: NAD, pleasant  HEENT: normocephalic and atraumatic. EOMI. PERRL.  Anicteric  NECK: Supple.   LUNGS: Clear to auscultation.  HEART: Regular rate and rhythm  ABDOMEN: Soft, nontender, and nondistended.  Positive bowel sounds.    : No CVA tenderness  EXTREMITIES: Without any edema.  MSK: No joint swelling  NEUROLOGIC:  No Focal Deficits  PSYCHIATRIC: Appropriate affect .  SKIN: Left foot erythema       Vitals:  T(F): 97.8 (29 Oct 2020 08:07), Max: 98.2 (28 Oct 2020 16:17)  HR: 85 (29 Oct 2020 08:07)  BP: 111/59 (29 Oct 2020 08:07)  RR: 18 (29 Oct 2020 08:07)  SpO2: 96% (28 Oct 2020 23:38) (93% - 98%)  temp max in last 48H T(F): , Max: 98.6 (10-27-20 @ 15:27)      Current Antibiotics:  clindamycin IVPB 600 milliGRAM(s) IV Intermittent every 8 hours      Other medications:  enoxaparin Injectable 40 milliGRAM(s) SubCutaneous daily  escitalopram 5 milliGRAM(s) Oral daily  folic acid 1 milliGRAM(s) Oral daily  gabapentin 100 milliGRAM(s) Oral two times a day  polyethylene glycol 3350 17 Gram(s) Oral daily  senna 2 Tablet(s) Oral at bedtime  sodium chloride 0.9%. 1000 milliLiter(s) IV Continuous <Continuous>      Labs:             9.9    3.41  )-----------( 163      ( 29 Oct 2020 07:28 )             30.3      10-29    137  |  99  |  24.0<H>  ----------------------------<  102<H>  4.4   |  27.0  |  1.09    Ca    9.3      29 Oct 2020 07:28  Phos  3.5     10-29  Mg     2.1     10-29      Culture - Urine (collected 10-26-20 @ 01:45)  Source: .Urine Clean Catch (Midstream)  Final Report (10-26-20 @ 22:18):    <10,000 CFU/mL Normal Urogenital Mercedes    Culture - Blood (collected 10-25-20 @ 21:07)  Source: .Blood Blood-Peripheral    Culture - Blood (collected 10-25-20 @ 21:05)  Source: .Blood Blood-Peripheral      WBC Count: 3.41 K/uL (10-29-20 @ 07:28)  WBC Count: 4.44 K/uL (10-26-20 @ 09:14)  WBC Count: 6.29 K/uL (10-25-20 @ 21:05)    Creatinine, Serum: 1.09 mg/dL (10-29-20 @ 07:28)  Creatinine, Serum: 1.37 mg/dL (10-28-20 @ 07:05)  Creatinine, Serum: 0.78 mg/dL (10-26-20 @ 09:14)  Creatinine, Serum: 0.79 mg/dL (10-25-20 @ 21:05)    COVID-19 IgG Antibody Index: <0.10 Index (10-26-20 @ 16:49)  COVID-19 IgG Antibody Interpretation: Negative (10-26-20 @ 16:49)  COVID-19 PCR: NotDetec (10-25-20 @ 21:54)

## 2020-10-29 NOTE — PROGRESS NOTE ADULT - SUBJECTIVE AND OBJECTIVE BOX
HPI: Patient is a 65y Female seen on consultation for the evaluation and management of lung cancer; seen [facility] of [facility]  Pt has Stage IV lung cancer  in chest, s/p attempt at resection 9/4/2020; found to be unresectable.  Received first tx last week with Carbopl based regimin with Keytruda per pt report.  Also had broken left foot prior.  Came to ER with swelling and erythema in left foot, assoc with fever to 100 and chills.  Now on antibiotics with improvement in swelling and erythema.  Dyspnea at baseline; no cough, no hemoptysis  No nausea or vomiting.     INTERVAL HISTORY    patient reports she is feeling better with less pain in his left foot. no fevers/ The patient reports improving erythema. she is not weight bearing  on left leg.  No other acute events ON. abx changed and she is now on IV clindamycin.    PAST MEDICAL & SURGICAL HISTORY:  Calcaneal fracture    Lung cancer    Lung nodule  multiple nodules pending biopsy    Pneumonia  May 2020 treated outpatient    COPD without exacerbation  no recent hospitalizations    Shoulder pain with history of repair of rotator cuff  2015    Cervical cancer  s/p hysterectomy 2005    H/O total hysterectomy  2005    H/O cyst of breast  removed with biopsy 1980    Osteochondroma of bone  left lower extremity s/p surgery at 15 years of age      MEDICATIONS  (STANDING):  clindamycin IVPB 600 milliGRAM(s) IV Intermittent every 8 hours  enoxaparin Injectable 40 milliGRAM(s) SubCutaneous daily  escitalopram 5 milliGRAM(s) Oral daily  folic acid 1 milliGRAM(s) Oral daily  gabapentin 100 milliGRAM(s) Oral two times a day  polyethylene glycol 3350 17 Gram(s) Oral daily  senna 2 Tablet(s) Oral at bedtime  sodium chloride 0.9%. 1000 milliLiter(s) (125 mL/Hr) IV Continuous <Continuous>      Allergies    amoxicillin (Unknown)  Keflex (Unknown)  penicillin (Unknown)  penicillins (Other)    Intolerances        ICU Vital Signs Last 24 Hrs  T(C): 36.6 (29 Oct 2020 08:07), Max: 36.8 (28 Oct 2020 16:17)  T(F): 97.8 (29 Oct 2020 08:07), Max: 98.2 (28 Oct 2020 16:17)  HR: 85 (29 Oct 2020 08:07) (79 - 85)  BP: 111/59 (29 Oct 2020 08:07) (111/59 - 113/73)  BP(mean): --  ABP: --  ABP(mean): --  RR: 18 (29 Oct 2020 08:07) (18 - 18)  SpO2: 96% (28 Oct 2020 23:38) (96% - 98%)    PHYSICAL EXAM:      Constitutional:Awake, alert, NAD, breathing comfortably    Eyes:anicteric    Neck:no adenopathy      Respiratory:decreased  BS left side    Cardiovascular:RRR norm S1S2    Gastrointestinal:soft, non-tender      Extremities:Left foot mildly swollen and erythematous on dorsum, less erythmatous and angry looking.                            9.9    3.41  )-----------( 163      ( 29 Oct 2020 07:28 )             30.3   10-29    137  |  99  |  24.0<H>  ----------------------------<  102<H>  4.4   |  27.0  |  1.09    Ca    9.3      29 Oct 2020 07:28  Phos  3.5     10-29  Mg     2.1     10-29          RADIOLOGY & ADDITIONAL STUDIES:

## 2020-10-29 NOTE — DISCHARGE NOTE PROVIDER - NSDCCPCAREPLAN_GEN_ALL_CORE_FT
PRINCIPAL DISCHARGE DIAGNOSIS  Diagnosis: Cellulitis of left lower extremity  Assessment and Plan of Treatment: treated with antbiotics      SECONDARY DISCHARGE DIAGNOSES  Diagnosis: Stage 4 lung cancer  Assessment and Plan of Treatment: undergoing chemo as outpt    Diagnosis: COPD, mild  Assessment and Plan of Treatment:     Diagnosis: Left calcaneal fracture  Assessment and Plan of Treatment: Ortho consulted- no new interventions     PRINCIPAL DISCHARGE DIAGNOSIS  Diagnosis: Cellulitis of left lower extremity  Assessment and Plan of Treatment: Continue antibiotics as prescribed.  Follow up with PMD / ID in 1 week.      SECONDARY DISCHARGE DIAGNOSES  Diagnosis: Stage 4 lung cancer  Assessment and Plan of Treatment: Follow up with Oncology as scheduled.    Diagnosis: Left calcaneal fracture  Assessment and Plan of Treatment: Ortho consulted- no new interventions.  Follow up with Ortho in 2 weeks.     PRINCIPAL DISCHARGE DIAGNOSIS  Diagnosis: Cellulitis of left lower extremity  Assessment and Plan of Treatment: Completed antibiotics while in house.  Follow up with PMD / ID in 1 week.      SECONDARY DISCHARGE DIAGNOSES  Diagnosis: Stage 4 lung cancer  Assessment and Plan of Treatment: Follow up with Oncology as scheduled.    Diagnosis: Left calcaneal fracture  Assessment and Plan of Treatment: Ortho consulted- no new interventions.  Follow up with Ortho in 2 weeks.

## 2020-10-29 NOTE — DISCHARGE NOTE PROVIDER - NSDCFUADDAPPT_GEN_ALL_CORE_FT
Follow up with PMD / ID in 1 week.  Follow up with Oncology as scheduled.  Follow up with Ortho in 2 weeks.

## 2020-10-29 NOTE — PROGRESS NOTE ADULT - SUBJECTIVE AND OBJECTIVE BOX
Cellulitis of left lower extremity    HPI:  65F hx COPD on home O2, anxiety, recent calcaneal fx, recent dx lung ca on chemo presents with worsening L foot swelling and erythema. Patient states diagnosed with Lung Ca over the summer and just recently started on chemotherapy last week (unclear exactly what, goes to [facility]). In August she also had a fall resulting in a calcaneal fracture. She didn't require surgery, but it was placed in a cast. This past week cast removed and patient started on PT and was walking on eleazar boot. Two days ago she noticed worsening redness and swelling of left foot. This continued to get worse and she started getting low grade fevers. Fevers and pain continued so she came to ED.     In ED, p tmax 100, p- 102, bp 118/81, RR- 18 and spo2 96% on RA. Pt received clindamycin and vancomycin. Denies cough, chest pain, sob, nausea, vomiting, diarrhea or dysuria. (26 Oct 2020 02:16)    Interval History:  Patient was seen and examined at bedside around 9:45 am.  Left foot swelling and redness are improving.   Cramps in left leg are much better.     Denies chest pain, palpitations, shortness of breath, headache, dizziness, visual symptoms, nausea, vomiting or abdominal pain.  Episodes of anxiety at times.     ROS:  As per interval history otherwise unremarkable.    PHYSICAL EXAM:  Vital Signs   T(C): 36.6 (29 Oct 2020 08:07), Max: 36.8 (28 Oct 2020 16:17)  T(F): 97.8 (29 Oct 2020 08:07), Max: 98.2 (28 Oct 2020 16:17)  HR: 85 (29 Oct 2020 08:07) (79 - 85)  BP: 111/59 (29 Oct 2020 08:07) (111/59 - 113/73)  RR: 18 (29 Oct 2020 08:07) (18 - 18)  SpO2: 96% (28 Oct 2020 23:38) (96% - 98%)  General: Elderly female sitting in bed comfortably. No acute distress  HEENT: EOMI. Clear conjunctivae. Moist mucus membrane  Neck: Supple.   Chest: CTA bilaterally - no wheezing, rales or rhonchi.   Heart: Normal S1 & S2 with RRR.   Abdomen: Soft. Non-tender. Non-distended. + BS  Ext: No pedal edema. No calf tenderness. Left Foot: Swollen, erythematous (improving). No warmth or tenderness. ROM limited.   Neuro: AAO x 3. No focal deficit. No speech disorder.  Skin: Warm and Dry  Psychiatry: Normal mood and affect    MEDICATIONS  (STANDING):  clindamycin IVPB 600 milliGRAM(s) IV Intermittent every 8 hours  enoxaparin Injectable 40 milliGRAM(s) SubCutaneous daily  escitalopram 10 milliGRAM(s) Oral at bedtime  folic acid 1 milliGRAM(s) Oral daily  gabapentin 100 milliGRAM(s) Oral two times a day  polyethylene glycol 3350 17 Gram(s) Oral daily  senna 2 Tablet(s) Oral at bedtime  sodium chloride 0.9%. 1000 milliLiter(s) (125 mL/Hr) IV Continuous <Continuous>    MEDICATIONS  (PRN):  acetaminophen   Tablet .. 650 milliGRAM(s) Oral every 6 hours PRN Temp greater or equal to 38C (100.4F), Mild Pain (1 - 3), Moderate Pain (4 - 6)  ALBUTerol    90 MICROgram(s) HFA Inhaler 2 Puff(s) Inhalation every 6 hours PRN Shortness of Breath and/or Wheezing  ALPRAZolam 0.5 milliGRAM(s) Oral daily PRN anxiety  traMADol 25 milliGRAM(s) Oral every 6 hours PRN Severe Pain (7 - 10)    LABS:                        9.9    3.41  )-----------( 163      ( 29 Oct 2020 07:28 )             30.3     10-29    137  |  99  |  24.0<H>  ----------------------------<  102<H>  4.4   |  27.0  |  1.09    Ca    9.3      29 Oct 2020 07:28  Phos  3.5     10-29  Mg     2.1     10-29    Blood Culture: 10-26 @ 01:45  Organism --  Gram Stain Blood -- Gram Stain --  Specimen Source .Urine Clean Catch (Midstream)  Culture-Blood --    10-25 @ 21:07  Organism --  Gram Stain Blood -- Gram Stain --  Specimen Source .Blood Blood-Peripheral  Culture-Blood --    10-25 @ 21:05  Organism --  Gram Stain Blood -- Gram Stain --  Specimen Source .Blood Blood-Peripheral  Culture-Blood --    RADIOLOGY & ADDITIONAL STUDIES:  Reviewed

## 2020-10-29 NOTE — DISCHARGE NOTE PROVIDER - PROVIDER TOKENS
PROVIDER:[TOKEN:[[id number]],FOLLOWUP:[1-3 days]],PROVIDER:[TOKEN:[[id number]],FOLLOWUP:[1-3 days]] PROVIDER:[TOKEN:[[id number]],FOLLOWUP:[1-3 days]],PROVIDER:[TOKEN:[[id number]]],PROVIDER:[TOKEN:[[id number]]]

## 2020-10-29 NOTE — DISCHARGE NOTE PROVIDER - NSDCMRMEDTOKEN_GEN_ALL_CORE_FT
Albuterol (Eqv-ProAir HFA) 90 mcg/inh inhalation aerosol: 2 puff(s) inhaled every 6 hours  ALPRAZolam 0.5 mg oral tablet: 1 tab(s) orally once a day, As Needed  Breo Ellipta 100 mcg-25 mcg/inh inhalation powder: 1 puff(s) inhaled once a day  Lexapro 5 mg oral tablet: orally once a day  Senna: 2 tab(s) orally once a day (at bedtime)   acetaminophen 325 mg oral tablet: 2 tab(s) orally every 6 hours, As needed, Temp greater or equal to 38C (100.4F), Mild Pain (1 - 3), Moderate Pain (4 - 6)  Albuterol (Eqv-ProAir HFA) 90 mcg/inh inhalation aerosol: 2 puff(s) inhaled every 6 hours  ALPRAZolam 0.5 mg oral tablet: 1 tab(s) orally once a day, As Needed  Breo Ellipta 100 mcg-25 mcg/inh inhalation powder: 1 puff(s) inhaled once a day  folic acid 1 mg oral tablet: 1 tab(s) orally once a day  gabapentin 100 mg oral capsule: 1 cap(s) orally 2 times a day  Lexapro 5 mg oral tablet: 2 tab(s) orally once a day (at bedtime)  Senna: 2 tab(s) orally once a day (at bedtime)   acetaminophen 325 mg oral tablet: 2 tab(s) orally every 6 hours, As needed, Temp greater or equal to 38C (100.4F), Mild Pain (1 - 3), Moderate Pain (4 - 6)  Albuterol (Eqv-ProAir HFA) 90 mcg/inh inhalation aerosol: 2 puff(s) inhaled every 6 hours  ALPRAZolam 0.5 mg oral tablet: 1 tab(s) orally once a day, As Needed MDD:1 tab  Breo Ellipta 100 mcg-25 mcg/inh inhalation powder: 1 puff(s) inhaled once a day  folic acid 1 mg oral tablet: 1 tab(s) orally once a day  gabapentin 100 mg oral capsule: 1 cap(s) orally 2 times a day  Lexapro 5 mg oral tablet: 2 tab(s) orally once a day (at bedtime)  Senna: 2 tab(s) orally once a day (at bedtime)  traMADol 50 mg oral tablet: 0.5 tab(s) orally every 6 hours, As needed, Severe Pain (7 - 10) MDD:2 tabs

## 2020-10-29 NOTE — DISCHARGE NOTE PROVIDER - CARE PROVIDER_API CALL
Nena Vu)  HematologyOncology; Internal Medicine; Medical Oncology  [address]  [place] [address]  Phone: [phone]  Fax: [phone]  Follow Up Time: 1-3 days    Alex Dominguez  [facility] MEDICINE  [address] [place] [address]  Phone: [phone]  Fax: [phone]  Follow Up Time: 1-3 days   Alex Dominguez  FAMILY MEDICINE  [address], [place] [address]  Phone: [phone]  Fax: [phone]  Follow Up Time: 1-3 days    Jayden Miller)  Infectious Disease; Internal Medicine  [address] [place] [address]  Phone: [phone]  Fax: [phone]  Follow Up Time:     Saúl Cesar)  Orthopaedic Surgery  [address] [place] [address]  Phone: [phone]  Fax: [phone]  Follow Up Time:

## 2020-10-29 NOTE — DISCHARGE NOTE PROVIDER - CARE PROVIDERS DIRECT ADDRESSES
,DirectAddress_Unknown,DirectAddress_Unknown ,DirectAddress_Unknown,[email or web address]

## 2020-10-29 NOTE — PROGRESS NOTE ADULT - ASSESSMENT
65 years old female with PMH of COPD on home Oxygen, Lung Cancer on Chemotherapy, Left Calcaneal Fracture (treated non operatively) and Anxiety presented with left foot swelling, redness and fever.     1) Left Foot Cellulitis  - Blood cultures negative   - Continue Clindamycin 600 mg q 8 hours till 11/1/20  - Vancomycin was discontinued on 10/28  - ID Consult appreciated      2) Left Calcaneal Fracture  - Treated non operatively in August with Camboot + NWB  - Ortho Consult appreciated: no intervention   - WBAT   - PT    3) Lung Cancer (Stage IV NSCLC)  - s/p 1st cycle of chemotherapy  - Neurontin added for neuropathy   - Oncology following     4) COPD (on home oxygen)  - Oxygen PRN   - Continue Breo Ellipta (patient's own med)   - Albuterol PRN    5) Anxiety  - Increased Lexapro to 10 mg daily  - Xanax 0.5 mg PRN    6) EMILIANO + Hyponatremia  - Likely due to dehydration  - Improving   - Continue IVF   - Avoid nephrotoxic medications  - Monitor renal function     DVT Prophylaxis -- Lovenox SQ    Dispo: Home with PT in 48 hours       65 years old female with PMH of COPD on home Oxygen, Lung Cancer on Chemotherapy, Left Calcaneal Fracture (treated non operatively) and Anxiety presented with left foot swelling, redness and fever.     1) Left Foot Cellulitis  - Blood cultures negative   - Continue Clindamycin 600 mg q 8 hours till 11/1/20  - Vancomycin was discontinued on 10/28  - ID Consult appreciated      2) Left Calcaneal Fracture  - Treated non operatively in August with Camboot + NWB  - Ortho Consult appreciated: no intervention   - WBAT   - PT    3) Lung Cancer (Stage IV NSCLC)  - s/p 1st cycle of chemotherapy  - Neurontin added for neuropathy   - Oncology following     4) COPD (on home oxygen)  - Oxygen PRN   - Continue Breo Ellipta (patient's own med)   - Albuterol PRN    5) Anxiety  - Increased Lexapro to 10 mg daily  - Xanax 0.5 mg PRN    6) EMILIANO + Hyponatremia  - Likely due to dehydration  - Improving   - Continue IVF   - Avoid nephrotoxic medications  - Monitor renal function     7) Anemia and Leukopenia  - Likely due to chemotherapy   - Monitor     DVT Prophylaxis -- Lovenox SQ    Dispo: Home with PT in 48 hours

## 2020-10-29 NOTE — DISCHARGE NOTE PROVIDER - NSDCFUSCHEDAPPT_GEN_ALL_CORE_FT
TAMAR LIM ; 12/12/2020 ; [address]   TAMAR LIM ; 12/17/2020 ; RH[address]DA Longo [address] ; 12/17/2020 ; [address]

## 2020-10-29 NOTE — PROGRESS NOTE ADULT - ASSESSMENT
Imp:  64 yo female with Stage IV NSCLC, inoperable, post first cycle of chemotherapy last week, admitted with cellulitis  Improving on IV antibiotics, no orthopedic intervention planned for calcaneal fracture.   Rec:  Monitor WBC post chemo. will order CBC daily  Antibiotics : on IV clindamycin until 11/1. Vancomycin DC because of EMILIANO, CMP stable.  on DVT prophylaxis    d/w [facility] team.

## 2020-10-30 LAB
ANION GAP SERPL CALC-SCNC: 11 MMOL/L — SIGNIFICANT CHANGE UP (ref 5–17)
BASOPHILS # BLD AUTO: 0.01 K/UL — SIGNIFICANT CHANGE UP (ref 0–0.2)
BASOPHILS NFR BLD AUTO: 0.3 % — SIGNIFICANT CHANGE UP (ref 0–2)
BUN SERPL-MCNC: 22 MG/DL — HIGH (ref 8–20)
CALCIUM SERPL-MCNC: 9.2 MG/DL — SIGNIFICANT CHANGE UP (ref 8.6–10.2)
CHLORIDE SERPL-SCNC: 99 MMOL/L — SIGNIFICANT CHANGE UP (ref 98–107)
CO2 SERPL-SCNC: 26 MMOL/L — SIGNIFICANT CHANGE UP (ref 22–29)
CREAT SERPL-MCNC: 1.05 MG/DL — SIGNIFICANT CHANGE UP (ref 0.5–1.3)
CULTURE RESULTS: SIGNIFICANT CHANGE UP
CULTURE RESULTS: SIGNIFICANT CHANGE UP
EOSINOPHIL # BLD AUTO: 0.11 K/UL — SIGNIFICANT CHANGE UP (ref 0–0.5)
EOSINOPHIL NFR BLD AUTO: 3.5 % — SIGNIFICANT CHANGE UP (ref 0–6)
GLUCOSE SERPL-MCNC: 106 MG/DL — HIGH (ref 70–99)
HCT VFR BLD CALC: 27.7 % — LOW (ref 34.5–45)
HGB BLD-MCNC: 9.2 G/DL — LOW (ref 11.5–15.5)
IMM GRANULOCYTES NFR BLD AUTO: 0.6 % — SIGNIFICANT CHANGE UP (ref 0–1.5)
LYMPHOCYTES # BLD AUTO: 1.18 K/UL — SIGNIFICANT CHANGE UP (ref 1–3.3)
LYMPHOCYTES # BLD AUTO: 37.6 % — SIGNIFICANT CHANGE UP (ref 13–44)
MAGNESIUM SERPL-MCNC: 2 MG/DL — SIGNIFICANT CHANGE UP (ref 1.6–2.6)
MCHC RBC-ENTMCNC: 31.4 PG — SIGNIFICANT CHANGE UP (ref 27–34)
MCHC RBC-ENTMCNC: 33.2 GM/DL — SIGNIFICANT CHANGE UP (ref 32–36)
MCV RBC AUTO: 94.5 FL — SIGNIFICANT CHANGE UP (ref 80–100)
MONOCYTES # BLD AUTO: 0.45 K/UL — SIGNIFICANT CHANGE UP (ref 0–0.9)
MONOCYTES NFR BLD AUTO: 14.3 % — HIGH (ref 2–14)
NEUTROPHILS # BLD AUTO: 1.37 K/UL — LOW (ref 1.8–7.4)
NEUTROPHILS NFR BLD AUTO: 43.7 % — SIGNIFICANT CHANGE UP (ref 43–77)
PLATELET # BLD AUTO: 151 K/UL — SIGNIFICANT CHANGE UP (ref 150–400)
POTASSIUM SERPL-MCNC: 4.8 MMOL/L — SIGNIFICANT CHANGE UP (ref 3.5–5.3)
POTASSIUM SERPL-SCNC: 4.8 MMOL/L — SIGNIFICANT CHANGE UP (ref 3.5–5.3)
RBC # BLD: 2.93 M/UL — LOW (ref 3.8–5.2)
RBC # FLD: 12.5 % — SIGNIFICANT CHANGE UP (ref 10.3–14.5)
SODIUM SERPL-SCNC: 136 MMOL/L — SIGNIFICANT CHANGE UP (ref 135–145)
SPECIMEN SOURCE: SIGNIFICANT CHANGE UP
SPECIMEN SOURCE: SIGNIFICANT CHANGE UP
WBC # BLD: 3.14 K/UL — LOW (ref 3.8–10.5)
WBC # FLD AUTO: 3.14 K/UL — LOW (ref 3.8–10.5)

## 2020-10-30 PROCEDURE — 99232 SBSQ HOSP IP/OBS MODERATE 35: CPT

## 2020-10-30 RX ADMIN — SENNA PLUS 2 TABLET(S): 8.6 TABLET ORAL at 21:11

## 2020-10-30 RX ADMIN — ENOXAPARIN SODIUM 40 MILLIGRAM(S): 100 INJECTION SUBCUTANEOUS at 12:15

## 2020-10-30 RX ADMIN — ESCITALOPRAM OXALATE 10 MILLIGRAM(S): 10 TABLET, FILM COATED ORAL at 21:11

## 2020-10-30 RX ADMIN — Medication 1 MILLIGRAM(S): at 12:15

## 2020-10-30 RX ADMIN — Medication 100 MILLIGRAM(S): at 15:10

## 2020-10-30 RX ADMIN — GABAPENTIN 100 MILLIGRAM(S): 400 CAPSULE ORAL at 17:34

## 2020-10-30 RX ADMIN — TRAMADOL HYDROCHLORIDE 25 MILLIGRAM(S): 50 TABLET ORAL at 12:14

## 2020-10-30 RX ADMIN — TRAMADOL HYDROCHLORIDE 25 MILLIGRAM(S): 50 TABLET ORAL at 12:18

## 2020-10-30 RX ADMIN — Medication 100 MILLIGRAM(S): at 06:23

## 2020-10-30 RX ADMIN — TRAMADOL HYDROCHLORIDE 25 MILLIGRAM(S): 50 TABLET ORAL at 21:11

## 2020-10-30 RX ADMIN — GABAPENTIN 100 MILLIGRAM(S): 400 CAPSULE ORAL at 06:29

## 2020-10-30 RX ADMIN — TRAMADOL HYDROCHLORIDE 25 MILLIGRAM(S): 50 TABLET ORAL at 13:30

## 2020-10-30 RX ADMIN — TRAMADOL HYDROCHLORIDE 25 MILLIGRAM(S): 50 TABLET ORAL at 22:10

## 2020-10-30 RX ADMIN — Medication 100 MILLIGRAM(S): at 21:11

## 2020-10-30 RX ADMIN — Medication 0.5 MILLIGRAM(S): at 13:53

## 2020-10-30 NOTE — PROGRESS NOTE ADULT - SUBJECTIVE AND OBJECTIVE BOX
CC: cellulitis (30 Oct 2020 08:59)    INTERVAL HPI/OVERNIGHT EVENTS:  no acute events overnight  patient notes improvement when she has her foot elevated  compared to photos of when she came in appears improved    Vital Signs Last 24 Hrs  T(C): 36.6 (30 Oct 2020 08:14), Max: 36.8 (29 Oct 2020 23:22)  T(F): 97.8 (30 Oct 2020 08:14), Max: 98.2 (29 Oct 2020 23:22)  HR: 70 (30 Oct 2020 08:14) (70 - 87)  BP: 109/68 (30 Oct 2020 08:14) (96/60 - 109/68)  BP(mean): --  RR: 19 (30 Oct 2020 08:14) (18 - 19)  SpO2: 95% (30 Oct 2020 08:14) (95% - 98%)    PHYSICAL EXAM:  General: Well developed; well nourished; in no acute distress  Eyes: PERRLA, EOMI; conjunctiva and sclera clear  Head: Normocephalic; atraumatic  ENMT: No nasal discharge; airway clear  Neck: Supple; non tender; no masses  Respiratory: No wheezes, rales or rhonchi  Cardiovascular: Regular rate and rhythm. S1 and S2 Normal; No murmurs, gallops or rubs  Gastrointestinal: Soft non-tender non-distended; Normal bowel sounds  Genitourinary: No costovertebral angle tenderness  Extremities: Normal range of motion, No clubbing, cyanosis; swelling of the left foot with erythema over the dorsum of her foot, increased warmth as well  Vascular: Peripheral pulses palpable 2+ bilaterally  Neurological: Alert and oriented x4  Skin: Warm and dry. No acute rash  Musculoskeletal: Normal gait, tone, without deformities  Psychiatric: Cooperative and appropriate    I&O's Detail               9.2    3.14  )-----------( 151      ( 30 Oct 2020 08:22 )             27.7     30 Oct 2020 08:22    136    |  99     |  22.0   ----------------------------<  106    4.8     |  26.0   |  1.05     Ca    9.2        30 Oct 2020 08:22  Phos  3.5       29 Oct 2020 07:28  Mg     2.0       30 Oct 2020 08:22    CAPILLARY BLOOD GLUCOSE    MEDICATIONS  (STANDING):  clindamycin IVPB 600 milliGRAM(s) IV Intermittent every 8 hours  enoxaparin Injectable 40 milliGRAM(s) SubCutaneous daily  escitalopram 10 milliGRAM(s) Oral at bedtime  folic acid 1 milliGRAM(s) Oral daily  gabapentin 100 milliGRAM(s) Oral two times a day  polyethylene glycol 3350 17 Gram(s) Oral daily  senna 2 Tablet(s) Oral at bedtime  sodium chloride 0.9%. 1000 milliLiter(s) (100 mL/Hr) IV Continuous <Continuous>    MEDICATIONS  (PRN):  acetaminophen   Tablet .. 650 milliGRAM(s) Oral every 6 hours PRN Temp greater or equal to 38C (100.4F), Mild Pain (1 - 3), Moderate Pain (4 - 6)  ALBUTerol    90 MICROgram(s) HFA Inhaler 2 Puff(s) Inhalation every 6 hours PRN Shortness of Breath and/or Wheezing  ALPRAZolam 0.5 milliGRAM(s) Oral daily PRN anxiety  traMADol 25 milliGRAM(s) Oral every 6 hours PRN Severe Pain (7 - 10)    RADIOLOGY & ADDITIONAL TESTS:

## 2020-10-30 NOTE — PROGRESS NOTE ADULT - ASSESSMENT
65 years old female with PMH of COPD on home Oxygen, Lung Cancer on Chemotherapy, Left Calcaneal Fracture (treated non operatively) and Anxiety presented with left foot swelling, redness and fever.     #Left Foot Cellulitis  - Blood cultures negative   - Continue Clindamycin 600 mg q 8 hours till 11/1/20  - Vancomycin was discontinued on 10/28  - ID Consult appreciated      #Left Calcaneal Fracture  - Treated non operatively in August with Camboot + NWB  - Ortho Consult appreciated: no intervention   - WBAT   - PT    #Lung Cancer (Stage IV NSCLC)  - s/p 1st cycle of chemotherapy  - Neurontin added for neuropathy   - Oncology following     #COPD (on home oxygen)  - Oxygen PRN   - Continue Breo Ellipta (patient's own med)   - Albuterol PRN    #Anxiety  - Increased Lexapro to 10 mg daily  - Xanax 0.5 mg PRN    #EMILIANO + Hyponatremia - resolved  - DC IV Fluids    #Anemia and Leukopenia; platelets are on the lower end of normal as well  - Likely due to chemotherapy   - Monitor for now    #DVT Prophylaxis -- Lovenox SQ    Dispo: Home with PT in 24-48 hours

## 2020-10-30 NOTE — PROGRESS NOTE ADULT - SUBJECTIVE AND OBJECTIVE BOX
HPI: Patient is a 65y Female seen on consultation for the evaluation and management of lung cancer; [facility]  Pt has Stage IV lung cancer  in chest, s/p attempt at resection 9/4/2020; found to be unresectable.  Received first tx last week with Carbopl based regimin with Keytruda per pt report.  Also had broken left foot prior.  Came to ER with swelling and erythema in left foot, assoc with fever to 100 and chills.  Now on antibiotics with improvement in swelling and erythema.  Dyspnea at baseline; no cough, no hemoptysis  No nausea or vomiting.     INTERVAL HISTORY    patient reports she is feeling better with less pain in his left foot. no fevers/ The patient reports improving erythema. Walked yesterday-states had increased swelling and pain    No other acute events ON. abx changed and she is now on IV clindamycin.Denies SOB, cough or chest pain    PAST MEDICAL & SURGICAL HISTORY:  Calcaneal fracture    Lung cancer    Lung nodule  multiple nodules pending biopsy    Pneumonia  May 2020 treated outpatient    COPD without exacerbation  no recent hospitalizations    Shoulder pain with history of repair of rotator cuff  2015    Cervical cancer  s/p hysterectomy 2005    H/O total hysterectomy  2005    H/O cyst of breast  removed with biopsy 1980    Osteochondroma of bone  left lower extremity s/p surgery at 15 years of age      MEDICATIONS  (STANDING):  clindamycin IVPB 600 milliGRAM(s) IV Intermittent every 8 hours  enoxaparin Injectable 40 milliGRAM(s) SubCutaneous daily  escitalopram 5 milliGRAM(s) Oral daily  folic acid 1 milliGRAM(s) Oral daily  gabapentin 100 milliGRAM(s) Oral two times a day  polyethylene glycol 3350 17 Gram(s) Oral daily  senna 2 Tablet(s) Oral at bedtime  sodium chloride 0.9%. 1000 milliLiter(s) (125 mL/Hr) IV Continuous <Continuous>      Allergies    amoxicillin (Unknown)  Keflex (Unknown)  penicillin (Unknown)  penicillins (Other)    Intolerances        ICU Vital Signs Last 24 Hrs  T(C): 36.6 (29 Oct 2020 08:07), Max: 36.8 (28 Oct 2020 16:17)  T(F): 97.8 (29 Oct 2020 08:07), Max: 98.2 (28 Oct 2020 16:17)  HR: 85 (29 Oct 2020 08:07) (79 - 85)  BP: 111/59 (29 Oct 2020 08:07) (111/59 - 113/73)  BP(mean): --  ABP: --  ABP(mean): --  RR: 18 (29 Oct 2020 08:07) (18 - 18)  SpO2: 96% (28 Oct 2020 23:38) (96% - 98%)    PHYSICAL EXAM:      Constitutional:Awake, alert, NAD, breathing comfortably    Eyes:anicteric    Neck:no adenopathy      Respiratory:decreased  BS left side    Cardiovascular:RRR norm S1S2    Gastrointestinal:soft, non-tender      Extremities:Left foot remains swollen and erythematous on dorsum,still erythmatous and swollen                            9.9    3.41  )-----------( 163      ( 29 Oct 2020 07:28 )             30.3   10-29    137  |  99  |  24.0<H>  ----------------------------<  102<H>  4.4   |  27.0  |  1.09    Ca    9.3      29 Oct 2020 07:28  Phos  3.5     10-29  Mg     2.1     10-29          RADIOLOGY & ADDITIONAL STUDIES:

## 2020-10-30 NOTE — PROGRESS NOTE ADULT - SUBJECTIVE AND OBJECTIVE BOX
[facility]  INFECTIOUS DISEASES AND INTERNAL MEDICINE at [place]  =======================================================  Timothy Shabazz MD  Diplomates American Board of Internal Medicine and Infectious Diseases  Tel: [phone]      Fax: [phone]  =======================================================    TAMAR LIM [id number]    Follow up: Left foot cellulitis    No fever or chills    No complaints       Allergies:  amoxicillin (Unknown)  Keflex (Unknown)  penicillin (Unknown)  penicillins (Other)      REVIEW OF SYSTEMS:  CONSTITUTIONAL:  No Fever or chills  HEENT:  No diplopia or blurred vision.  No earache, sore throat or runny nose.  CARDIOVASCULAR:  No pressure, squeezing, strangling, tightness, heaviness or aching about the chest, neck, axilla or epigastrium.  RESPIRATORY:  No cough, shortness of breath  GASTROINTESTINAL:  No nausea, vomiting or diarrhea.  GENITOURINARY:  No dysuria, frequency or urgency. No Blood in urine  MUSCULOSKELETAL:  no joint aches, no muscle pain  SKIN:  Left foot redness improved   NEUROLOGIC:  No Headaches, seizures or weakness.  PSYCHIATRIC:  No disorder of thought or mood.  ENDOCRINE:  No heat or cold intolerance  HEMATOLOGICAL:  No easy bruising or bleeding.       Physical Exam:  GEN: NAD, pleasant  HEENT: normocephalic and atraumatic. EOMI. PERRL.  Anicteric  NECK: Supple.   LUNGS: Clear to auscultation.  HEART: Regular rate and rhythm  ABDOMEN: Soft, nontender, and nondistended.  Positive bowel sounds.    : No CVA tenderness  EXTREMITIES: Without any edema.  MSK: No joint swelling  NEUROLOGIC:  No Focal Deficits  PSYCHIATRIC: Appropriate affect .  SKIN: Left foot erythema       Vitals:  T(F): 97.8 (30 Oct 2020 08:14), Max: 98.2 (29 Oct 2020 23:22)  HR: 70 (30 Oct 2020 08:14)  BP: 109/68 (30 Oct 2020 08:14)  RR: 19 (30 Oct 2020 08:14)  SpO2: 95% (30 Oct 2020 08:14) (95% - 98%)  temp max in last 48H T(F): , Max: 98.2 (10-28-20 @ 16:17)      Current Antibiotics:  clindamycin IVPB 600 milliGRAM(s) IV Intermittent every 8 hours      Other medications:  enoxaparin Injectable 40 milliGRAM(s) SubCutaneous daily  escitalopram 10 milliGRAM(s) Oral at bedtime  folic acid 1 milliGRAM(s) Oral daily  gabapentin 100 milliGRAM(s) Oral two times a day  polyethylene glycol 3350 17 Gram(s) Oral daily  senna 2 Tablet(s) Oral at bedtime  sodium chloride 0.9%. 1000 milliLiter(s) IV Continuous <Continuous>      Labs:             9.2    3.14  )-----------( 151      ( 30 Oct 2020 08:22 )             27.7      10-30    136  |  99  |  22.0<H>  ----------------------------<  106<H>  4.8   |  26.0  |  1.05    Ca    9.2      30 Oct 2020 08:22  Phos  3.5     10-29  Mg     2.0     10-30      Culture - Urine (collected 10-26-20 @ 01:45)  Source: .Urine Clean Catch (Midstream)  Final Report (10-26-20 @ 22:18):    <10,000 CFU/mL Normal Urogenital Mercedes    Culture - Blood (collected 10-25-20 @ 21:07)  Source: .Blood Blood-Peripheral    Culture - Blood (collected 10-25-20 @ 21:05)  Source: .Blood Blood-Peripheral    WBC Count: 3.14 K/uL (10-30-20 @ 08:22)  WBC Count: 3.41 K/uL (10-29-20 @ 07:28)  WBC Count: 4.44 K/uL (10-26-20 @ 09:14)  WBC Count: 6.29 K/uL (10-25-20 @ 21:05)    Creatinine, Serum: 1.05 mg/dL (10-30-20 @ 08:22)  Creatinine, Serum: 1.09 mg/dL (10-29-20 @ 07:28)  Creatinine, Serum: 1.37 mg/dL (10-28-20 @ 07:05)  Creatinine, Serum: 0.78 mg/dL (10-26-20 @ 09:14)  Creatinine, Serum: 0.79 mg/dL (10-25-20 @ 21:05)    COVID-19 IgG Antibody Index: <0.10 Index (10-26-20 @ 16:49)  COVID-19 IgG Antibody Interpretation: Negative (10-26-20 @ 16:49)  COVID-19 PCR: NotDetec (10-25-20 @ 21:54)

## 2020-10-30 NOTE — PROGRESS NOTE ADULT - ASSESSMENT
65y  Female with h/o COPD on home O2, anxiety, recent calcaneal fracture, recent dx lung ca on chemo, [place] [facility] [place] tot he ER this visit with c/o worsening L foot swelling and erythema. Patient states diagnosed with Lung Ca over the summer and just recently started on chemotherapy last week. In August she also had a fall resulting in a calcaneal fracture. She didn't require surgery, but it was placed in a cast. This past week cast removed and patient started on PT and was walking on eleazar boot. On Friday she noticed worsening redness and swelling of left foot. This continued to get worse and she started getting low grade fevers associated with chills. Patient decided to come to the ER. In the ER patient was afebrile, no leukocytosis. Patient as started on Clindamycin and Vancomycin and erythema improved.       Left foot cellulitis  Lung cancer on chemotherapy  Immunosuppressed due to chemotherapy   Former smoker   PCN allergy   Keflex allergy   EMILIANO    - Blood cultures no growth   - COVID 19 PCR negative    - Foot xray reporting cellulitis   - Monitor Cr  - Continue Clindamycin 600mg IV q 8hours  - Plan for treatment till 11/1/20  - Trend Fever  - Trend Leukocytosis      Will sign off. Please call PRN.

## 2020-10-31 LAB
ANION GAP SERPL CALC-SCNC: 11 MMOL/L — SIGNIFICANT CHANGE UP (ref 5–17)
BUN SERPL-MCNC: 22 MG/DL — HIGH (ref 8–20)
CALCIUM SERPL-MCNC: 9.4 MG/DL — SIGNIFICANT CHANGE UP (ref 8.6–10.2)
CHLORIDE SERPL-SCNC: 99 MMOL/L — SIGNIFICANT CHANGE UP (ref 98–107)
CO2 SERPL-SCNC: 28 MMOL/L — SIGNIFICANT CHANGE UP (ref 22–29)
CREAT SERPL-MCNC: 1.08 MG/DL — SIGNIFICANT CHANGE UP (ref 0.5–1.3)
GLUCOSE SERPL-MCNC: 111 MG/DL — HIGH (ref 70–99)
HCT VFR BLD CALC: 29.8 % — LOW (ref 34.5–45)
HGB BLD-MCNC: 9.8 G/DL — LOW (ref 11.5–15.5)
MCHC RBC-ENTMCNC: 31.2 PG — SIGNIFICANT CHANGE UP (ref 27–34)
MCHC RBC-ENTMCNC: 32.9 GM/DL — SIGNIFICANT CHANGE UP (ref 32–36)
MCV RBC AUTO: 94.9 FL — SIGNIFICANT CHANGE UP (ref 80–100)
PLATELET # BLD AUTO: 162 K/UL — SIGNIFICANT CHANGE UP (ref 150–400)
POTASSIUM SERPL-MCNC: 4.4 MMOL/L — SIGNIFICANT CHANGE UP (ref 3.5–5.3)
POTASSIUM SERPL-SCNC: 4.4 MMOL/L — SIGNIFICANT CHANGE UP (ref 3.5–5.3)
RBC # BLD: 3.14 M/UL — LOW (ref 3.8–5.2)
RBC # FLD: 12.3 % — SIGNIFICANT CHANGE UP (ref 10.3–14.5)
SODIUM SERPL-SCNC: 138 MMOL/L — SIGNIFICANT CHANGE UP (ref 135–145)
WBC # BLD: 3.48 K/UL — LOW (ref 3.8–10.5)
WBC # FLD AUTO: 3.48 K/UL — LOW (ref 3.8–10.5)

## 2020-10-31 PROCEDURE — 99232 SBSQ HOSP IP/OBS MODERATE 35: CPT

## 2020-10-31 RX ADMIN — TRAMADOL HYDROCHLORIDE 25 MILLIGRAM(S): 50 TABLET ORAL at 22:15

## 2020-10-31 RX ADMIN — ESCITALOPRAM OXALATE 10 MILLIGRAM(S): 10 TABLET, FILM COATED ORAL at 21:08

## 2020-10-31 RX ADMIN — Medication 1 MILLIGRAM(S): at 13:26

## 2020-10-31 RX ADMIN — GABAPENTIN 100 MILLIGRAM(S): 400 CAPSULE ORAL at 17:44

## 2020-10-31 RX ADMIN — Medication 100 MILLIGRAM(S): at 21:08

## 2020-10-31 RX ADMIN — SENNA PLUS 2 TABLET(S): 8.6 TABLET ORAL at 21:08

## 2020-10-31 RX ADMIN — Medication 100 MILLIGRAM(S): at 05:48

## 2020-10-31 RX ADMIN — TRAMADOL HYDROCHLORIDE 25 MILLIGRAM(S): 50 TABLET ORAL at 21:15

## 2020-10-31 RX ADMIN — TRAMADOL HYDROCHLORIDE 25 MILLIGRAM(S): 50 TABLET ORAL at 13:30

## 2020-10-31 RX ADMIN — ENOXAPARIN SODIUM 40 MILLIGRAM(S): 100 INJECTION SUBCUTANEOUS at 13:26

## 2020-10-31 RX ADMIN — Medication 100 MILLIGRAM(S): at 13:26

## 2020-10-31 RX ADMIN — Medication 0.5 MILLIGRAM(S): at 17:44

## 2020-10-31 RX ADMIN — TRAMADOL HYDROCHLORIDE 25 MILLIGRAM(S): 50 TABLET ORAL at 05:48

## 2020-10-31 RX ADMIN — TRAMADOL HYDROCHLORIDE 25 MILLIGRAM(S): 50 TABLET ORAL at 06:23

## 2020-10-31 RX ADMIN — GABAPENTIN 100 MILLIGRAM(S): 400 CAPSULE ORAL at 05:48

## 2020-10-31 NOTE — DIETITIAN INITIAL EVALUATION ADULT. - OTHER INFO
65 year old female with PMH of COPD on home oxygen, lung cancer (Stage IV NSCLC) on chemotherapy, Left calcaneal Fracture (treated non operatively) and anxiety presented with left foot swelling, redness and fever. Admitted with Left foot cellulitis. Pt reports PTA eating fairly well. Reports UBW about one year ago was 152 lbs and gained ~10 lbs due to retiring from job as a  and Left calcaneal fx; however, reports recent 3 lb wt loss. States received first round of chemo recently, po intake has remained overall fair. Reports yesterday and today is the first day she felt like she was not able to eat as much and appetite slightly decreased. Also reports feeling the start of some mouth sores. Breakfast tray observed at bedside, ~50% consumed per plate waste. Provided pt with verbal/written literature on high protein, high calorie nutrition therapy. All nutrition-related questions/concerns addressed.

## 2020-10-31 NOTE — PROGRESS NOTE ADULT - ASSESSMENT
Imp:  Inopertable lung cancer, managed at [facility], post one cycle Carbo/Pemextrexate/Keytruda  Calcaneal fracture  Left foot cellulitis improving on antibiotics  Mild leukopenia, anemia, secondary to infection, malignancy, recent treatment-for observation

## 2020-10-31 NOTE — DIETITIAN INITIAL EVALUATION ADULT. - CONTINUE CURRENT NUTRITION CARE PLAN
yes/- add Ensure Enlive TID to optimize po intake and provide an additional 350 kcal, 20g protein per serving.

## 2020-10-31 NOTE — PROGRESS NOTE ADULT - SUBJECTIVE AND OBJECTIVE BOX
HPI: Patient is a 65y Female seen on consultation for the evaluation and management of lung cancer; seen as part of [facility] with [facility].  Pt has Stage IV lung cancer  in chest, s/p attempt at resection 9/4/2020; found to be unresectable.  Received first tx last week with Carbopl based regimin with Keytruda per pt report.  Also had broken left foot prior.  Came to ER with swelling and erythema in left foot, assoc with fever to 100 and chills.  Now on antibiotics with improvement in swelling and erythema.  Dyspnea at baseline; no cough, no hemoptysis  No nausea or vomiting.     INTERVAL HISTORY    patient reports she is feeling better with less pain in his left foot. no fevers/ The patient reports improving erythema. Walked yesterday-states had increased swelling and pain    No other acute events ON. abx changed and she is now on IV clindamycin.Denies SOB, cough or chest pain  Erythema improved today    PAST MEDICAL & SURGICAL HISTORY:  Calcaneal fracture    Lung cancer    Lung nodule  multiple nodules pending biopsy    Pneumonia  May 2020 treated outpatient    COPD without exacerbation  no recent hospitalizations    Shoulder pain with history of repair of rotator cuff  2015    Cervical cancer  s/p hysterectomy 2005    H/O total hysterectomy  2005    H/O cyst of breast  removed with biopsy 1980    Osteochondroma of bone  left lower extremity s/p surgery at 15 years of age      MEDICATIONS  (STANDING):  clindamycin IVPB 600 milliGRAM(s) IV Intermittent every 8 hours  enoxaparin Injectable 40 milliGRAM(s) SubCutaneous daily  escitalopram 5 milliGRAM(s) Oral daily  folic acid 1 milliGRAM(s) Oral daily  gabapentin 100 milliGRAM(s) Oral two times a day  polyethylene glycol 3350 17 Gram(s) Oral daily  senna 2 Tablet(s) Oral at bedtime  sodium chloride 0.9%. 1000 milliLiter(s) (125 mL/Hr) IV Continuous <Continuous>      Allergies    amoxicillin (Unknown)  Keflex (Unknown)  penicillin (Unknown)  penicillins (Other)    Intolerances        ICU Vital Signs Last 24 Hrs  T(C): 36.6 (29 Oct 2020 08:07), Max: 36.8 (28 Oct 2020 16:17)  T(F): 97.8 (29 Oct 2020 08:07), Max: 98.2 (28 Oct 2020 16:17)  HR: 85 (29 Oct 2020 08:07) (79 - 85)  BP: 111/59 (29 Oct 2020 08:07) (111/59 - 113/73)  BP(mean): --  ABP: --  ABP(mean): --  RR: 18 (29 Oct 2020 08:07) (18 - 18)  SpO2: 96% (28 Oct 2020 23:38) (96% - 98%)    PHYSICAL EXAM:      Constitutional:Awake, alert, NAD, breathing comfortably    Eyes:anicteric    Neck:no adenopathy      Respiratory:decreased  BS left side    Cardiovascular:RRR norm S1S2    Gastrointestinal:soft, non-tender      Extremities:Left foot remains swollen and erythematous on dorsum,still erythmatous and swollen    3,13  H/H 9.8/29.8, plt ct 162,000                        9.9    3.41  )-----------( 163      ( 29 Oct 2020 07:28 )             30.3   10-29    137  |  99  |  24.0<H>  ----------------------------<  102<H>  4.4   |  27.0  |  1.09    Ca    9.3      29 Oct 2020 07:28  Phos  3.5     10-29  Mg     2.1     10-29          RADIOLOGY & ADDITIONAL STUDIES:

## 2020-10-31 NOTE — DIETITIAN INITIAL EVALUATION ADULT. - ETIOLOGY
related to inability to meet sufficient protein-energy in setting of Stage IV NSCLC lung cancer (+chemo) , COPD, and Left foot cellulitis

## 2020-10-31 NOTE — PROGRESS NOTE ADULT - NUTRITIONAL ASSESSMENT
This patient has been assessed with a concern for Malnutrition and has been determined to have a diagnosis/diagnoses of Moderate protein-calorie malnutrition.    This patient is being managed with:   Diet Regular-  Entered: Oct 26 2020  1:24AM

## 2020-10-31 NOTE — DIETITIAN INITIAL EVALUATION ADULT. - PERTINENT MEDS FT
MEDICATIONS  (STANDING):  clindamycin IVPB 600 milliGRAM(s) IV Intermittent every 8 hours  enoxaparin Injectable 40 milliGRAM(s) SubCutaneous daily  escitalopram 10 milliGRAM(s) Oral at bedtime  folic acid 1 milliGRAM(s) Oral daily  gabapentin 100 milliGRAM(s) Oral two times a day  polyethylene glycol 3350 17 Gram(s) Oral daily  senna 2 Tablet(s) Oral at bedtime    MEDICATIONS  (PRN):  acetaminophen   Tablet .. 650 milliGRAM(s) Oral every 6 hours PRN Temp greater or equal to 38C (100.4F), Mild Pain (1 - 3), Moderate Pain (4 - 6)  ALBUTerol    90 MICROgram(s) HFA Inhaler 2 Puff(s) Inhalation every 6 hours PRN Shortness of Breath and/or Wheezing  ALPRAZolam 0.5 milliGRAM(s) Oral daily PRN anxiety  traMADol 25 milliGRAM(s) Oral every 6 hours PRN Severe Pain (7 - 10)

## 2020-10-31 NOTE — DIETITIAN INITIAL EVALUATION ADULT. - ADD RECOMMEND
Encourage po intake, monitor diet tolerance, and provide assistance at meals as needed. Obtain daily weights to monitor trends.

## 2020-10-31 NOTE — CHART NOTE - TREATMENT: THE FOLLOWING DIET HAS BEEN RECOMMENDED
Continue diet as tolerated.   Add Ensure Enlive TID to optimize po intake and provide an additional 350 kcal, 20g protein per serving.  Encourage po intake, monitor diet tolerance, and provide assistance at meals as needed.   Obtain daily weights to monitor trends.

## 2020-11-01 ENCOUNTER — TRANSCRIPTION ENCOUNTER (OUTPATIENT)
Age: 65
End: 2020-11-01

## 2020-11-01 VITALS
RESPIRATION RATE: 19 BRPM | DIASTOLIC BLOOD PRESSURE: 65 MMHG | HEART RATE: 89 BPM | SYSTOLIC BLOOD PRESSURE: 109 MMHG | TEMPERATURE: 98 F

## 2020-11-01 LAB
ANION GAP SERPL CALC-SCNC: 13 MMOL/L — SIGNIFICANT CHANGE UP (ref 5–17)
BASOPHILS # BLD AUTO: 0.01 K/UL — SIGNIFICANT CHANGE UP (ref 0–0.2)
BASOPHILS NFR BLD AUTO: 0.2 % — SIGNIFICANT CHANGE UP (ref 0–2)
BUN SERPL-MCNC: 20 MG/DL — SIGNIFICANT CHANGE UP (ref 8–20)
CALCIUM SERPL-MCNC: 9.9 MG/DL — SIGNIFICANT CHANGE UP (ref 8.6–10.2)
CHLORIDE SERPL-SCNC: 96 MMOL/L — LOW (ref 98–107)
CO2 SERPL-SCNC: 26 MMOL/L — SIGNIFICANT CHANGE UP (ref 22–29)
CREAT SERPL-MCNC: 1.14 MG/DL — SIGNIFICANT CHANGE UP (ref 0.5–1.3)
EOSINOPHIL # BLD AUTO: 0.12 K/UL — SIGNIFICANT CHANGE UP (ref 0–0.5)
EOSINOPHIL NFR BLD AUTO: 2.2 % — SIGNIFICANT CHANGE UP (ref 0–6)
GLUCOSE SERPL-MCNC: 114 MG/DL — HIGH (ref 70–99)
HCT VFR BLD CALC: 33.8 % — LOW (ref 34.5–45)
HGB BLD-MCNC: 11.2 G/DL — LOW (ref 11.5–15.5)
IMM GRANULOCYTES NFR BLD AUTO: 0.6 % — SIGNIFICANT CHANGE UP (ref 0–1.5)
LYMPHOCYTES # BLD AUTO: 1.87 K/UL — SIGNIFICANT CHANGE UP (ref 1–3.3)
LYMPHOCYTES # BLD AUTO: 34.6 % — SIGNIFICANT CHANGE UP (ref 13–44)
MCHC RBC-ENTMCNC: 30.9 PG — SIGNIFICANT CHANGE UP (ref 27–34)
MCHC RBC-ENTMCNC: 33.1 GM/DL — SIGNIFICANT CHANGE UP (ref 32–36)
MCV RBC AUTO: 93.1 FL — SIGNIFICANT CHANGE UP (ref 80–100)
MONOCYTES # BLD AUTO: 0.58 K/UL — SIGNIFICANT CHANGE UP (ref 0–0.9)
MONOCYTES NFR BLD AUTO: 10.7 % — SIGNIFICANT CHANGE UP (ref 2–14)
NEUTROPHILS # BLD AUTO: 2.79 K/UL — SIGNIFICANT CHANGE UP (ref 1.8–7.4)
NEUTROPHILS NFR BLD AUTO: 51.7 % — SIGNIFICANT CHANGE UP (ref 43–77)
PLATELET # BLD AUTO: 195 K/UL — SIGNIFICANT CHANGE UP (ref 150–400)
POTASSIUM SERPL-MCNC: 4.7 MMOL/L — SIGNIFICANT CHANGE UP (ref 3.5–5.3)
POTASSIUM SERPL-SCNC: 4.7 MMOL/L — SIGNIFICANT CHANGE UP (ref 3.5–5.3)
RBC # BLD: 3.63 M/UL — LOW (ref 3.8–5.2)
RBC # FLD: 12.5 % — SIGNIFICANT CHANGE UP (ref 10.3–14.5)
SODIUM SERPL-SCNC: 135 MMOL/L — SIGNIFICANT CHANGE UP (ref 135–145)
WBC # BLD: 5.4 K/UL — SIGNIFICANT CHANGE UP (ref 3.8–10.5)
WBC # FLD AUTO: 5.4 K/UL — SIGNIFICANT CHANGE UP (ref 3.8–10.5)

## 2020-11-01 PROCEDURE — 87086 URINE CULTURE/COLONY COUNT: CPT

## 2020-11-01 PROCEDURE — 97110 THERAPEUTIC EXERCISES: CPT

## 2020-11-01 PROCEDURE — 87040 BLOOD CULTURE FOR BACTERIA: CPT

## 2020-11-01 PROCEDURE — 97116 GAIT TRAINING THERAPY: CPT

## 2020-11-01 PROCEDURE — 97163 PT EVAL HIGH COMPLEX 45 MIN: CPT

## 2020-11-01 PROCEDURE — 85610 PROTHROMBIN TIME: CPT

## 2020-11-01 PROCEDURE — 73630 X-RAY EXAM OF FOOT: CPT

## 2020-11-01 PROCEDURE — 99285 EMERGENCY DEPT VISIT HI MDM: CPT | Mod: 25

## 2020-11-01 PROCEDURE — 86803 HEPATITIS C AB TEST: CPT

## 2020-11-01 PROCEDURE — 96375 TX/PRO/DX INJ NEW DRUG ADDON: CPT

## 2020-11-01 PROCEDURE — 80202 ASSAY OF VANCOMYCIN: CPT

## 2020-11-01 PROCEDURE — 84100 ASSAY OF PHOSPHORUS: CPT

## 2020-11-01 PROCEDURE — 83735 ASSAY OF MAGNESIUM: CPT

## 2020-11-01 PROCEDURE — U0003: CPT

## 2020-11-01 PROCEDURE — 99239 HOSP IP/OBS DSCHRG MGMT >30: CPT

## 2020-11-01 PROCEDURE — 36415 COLL VENOUS BLD VENIPUNCTURE: CPT

## 2020-11-01 PROCEDURE — 86769 SARS-COV-2 COVID-19 ANTIBODY: CPT

## 2020-11-01 PROCEDURE — 85025 COMPLETE CBC W/AUTO DIFF WBC: CPT

## 2020-11-01 PROCEDURE — 71046 X-RAY EXAM CHEST 2 VIEWS: CPT

## 2020-11-01 PROCEDURE — 80048 BASIC METABOLIC PNL TOTAL CA: CPT

## 2020-11-01 PROCEDURE — 97530 THERAPEUTIC ACTIVITIES: CPT

## 2020-11-01 PROCEDURE — 93971 EXTREMITY STUDY: CPT

## 2020-11-01 PROCEDURE — 85730 THROMBOPLASTIN TIME PARTIAL: CPT

## 2020-11-01 PROCEDURE — 83605 ASSAY OF LACTIC ACID: CPT

## 2020-11-01 PROCEDURE — 85027 COMPLETE CBC AUTOMATED: CPT

## 2020-11-01 PROCEDURE — 81001 URINALYSIS AUTO W/SCOPE: CPT

## 2020-11-01 PROCEDURE — 80053 COMPREHEN METABOLIC PANEL: CPT

## 2020-11-01 PROCEDURE — 96365 THER/PROPH/DIAG IV INF INIT: CPT

## 2020-11-01 PROCEDURE — 93005 ELECTROCARDIOGRAM TRACING: CPT

## 2020-11-01 RX ORDER — ALPRAZOLAM 0.25 MG
1 TABLET ORAL
Qty: 0 | Refills: 0 | DISCHARGE

## 2020-11-01 RX ORDER — TRAMADOL HYDROCHLORIDE 50 MG/1
0.5 TABLET ORAL
Qty: 14 | Refills: 0
Start: 2020-11-01 | End: 2020-11-07

## 2020-11-01 RX ORDER — ALPRAZOLAM 0.25 MG
1 TABLET ORAL
Qty: 7 | Refills: 0
Start: 2020-11-01 | End: 2020-11-07

## 2020-11-01 RX ADMIN — TRAMADOL HYDROCHLORIDE 25 MILLIGRAM(S): 50 TABLET ORAL at 14:04

## 2020-11-01 RX ADMIN — Medication 100 MILLIGRAM(S): at 13:40

## 2020-11-01 RX ADMIN — TRAMADOL HYDROCHLORIDE 25 MILLIGRAM(S): 50 TABLET ORAL at 13:46

## 2020-11-01 RX ADMIN — ENOXAPARIN SODIUM 40 MILLIGRAM(S): 100 INJECTION SUBCUTANEOUS at 11:28

## 2020-11-01 RX ADMIN — TRAMADOL HYDROCHLORIDE 25 MILLIGRAM(S): 50 TABLET ORAL at 06:30

## 2020-11-01 RX ADMIN — TRAMADOL HYDROCHLORIDE 25 MILLIGRAM(S): 50 TABLET ORAL at 05:20

## 2020-11-01 RX ADMIN — Medication 1 MILLIGRAM(S): at 08:20

## 2020-11-01 RX ADMIN — Medication 100 MILLIGRAM(S): at 05:13

## 2020-11-01 RX ADMIN — GABAPENTIN 100 MILLIGRAM(S): 400 CAPSULE ORAL at 05:13

## 2020-11-01 NOTE — DISCHARGE NOTE NURSING/CASE MANAGEMENT/SOCIAL WORK - PATIENT PORTAL LINK FT
You can access the FollowMyHealth Patient Portal offered by [facility] by registering at the following website: [email or web address]. By joining [employer] [employer]’s FollowMyHealth portal, you will also be able to view your health information using other applications (apps) compatible with our system.

## 2020-11-01 NOTE — PROGRESS NOTE ADULT - ASSESSMENT
65 years old female with PMH of COPD on home Oxygen, Lung Cancer on Chemotherapy, Left Calcaneal Fracture (treated non operatively) and Anxiety presented with left foot swelling, redness and fever.     #Left Foot Cellulitis  - Blood cultures negative   - complete clindamycin while in house - finishes tomorrow  - Vancomycin was discontinued on 10/28  - ID Consult appreciated      #Left Calcaneal Fracture  - Treated non operatively in August with [place] + NWB  - Ortho Consult appreciated: no intervention   - WBAT   - PT    #Lung Cancer (Stage IV NSCLC)  - s/p 1st cycle of chemotherapy  - Neurontin added for neuropathy   - Oncology following     #COPD (on home oxygen)  - Oxygen PRN   - Continue Breo Ellipta (patient's own med)   - Albuterol PRN    #Anxiety  - Increased Lexapro to 10 mg daily  - Xanax 0.5 mg PRN    #EMILIANO + Hyponatremia - resolved  - DC IV Fluids    #Anemia and Leukopenia; platelets are on the lower end of normal as well  - Likely due to chemotherapy   - stable    #DVT Prophylaxis -- Lovenox SQ    Dispo: Home today

## 2020-11-01 NOTE — PROGRESS NOTE ADULT - SUBJECTIVE AND OBJECTIVE BOX
CC: cellulitis (30 Oct 2020 08:59)    INTERVAL HPI/OVERNIGHT EVENTS:  no acute events overnight  patient notes improvement when she has her foot elevated  compared to photos of when she came in appears improved    PHYSICAL EXAM:  General: Well developed; well nourished; in no acute distress  Eyes: PERRLA, EOMI; conjunctiva and sclera clear  Head: Normocephalic; atraumatic  ENMT: No nasal discharge; airway clear  Neck: Supple; non tender; no masses  Respiratory: No wheezes, rales or rhonchi  Cardiovascular: Regular rate and rhythm. S1 and S2 Normal; No murmurs, gallops or rubs  Gastrointestinal: Soft non-tender non-distended; Normal bowel sounds  Genitourinary: No costovertebral angle tenderness  Extremities: Normal range of motion, No clubbing, cyanosis; swelling of the left foot with erythema over the dorsum of her foot, increased warmth as well  Vascular: Peripheral pulses palpable 2+ bilaterally  Neurological: Alert and oriented x4  Skin: Warm and dry. No acute rash  Musculoskeletal: Normal gait, tone, without deformities  Psychiatric: Cooperative and appropriate                          9.8    3.48  )-----------( 162      ( 31 Oct 2020 10:25 )             29.8     10-31    138  |  99  |  22.0<H>  ----------------------------<  111<H>  4.4   |  28.0  |  1.08    Ca    9.4      31 Oct 2020 10:25    MEDICATIONS  (STANDING):  clindamycin IVPB 600 milliGRAM(s) IV Intermittent every 8 hours  enoxaparin Injectable 40 milliGRAM(s) SubCutaneous daily  escitalopram 10 milliGRAM(s) Oral at bedtime  folic acid 1 milliGRAM(s) Oral daily  gabapentin 100 milliGRAM(s) Oral two times a day  polyethylene glycol 3350 17 Gram(s) Oral daily  senna 2 Tablet(s) Oral at bedtime    MEDICATIONS  (PRN):  acetaminophen   Tablet .. 650 milliGRAM(s) Oral every 6 hours PRN Temp greater or equal to 38C (100.4F), Mild Pain (1 - 3), Moderate Pain (4 - 6)  ALBUTerol    90 MICROgram(s) HFA Inhaler 2 Puff(s) Inhalation every 6 hours PRN Shortness of Breath and/or Wheezing  ALPRAZolam 0.5 milliGRAM(s) Oral daily PRN anxiety  traMADol 25 milliGRAM(s) Oral every 6 hours PRN Severe Pain (7 - 10)    RADIOLOGY & ADDITIONAL TESTS:

## 2020-11-18 ENCOUNTER — INPATIENT (INPATIENT)
Facility: HOSPITAL | Age: 65
LOS: 5 days | Discharge: HOME | DRG: 176 | End: 2020-11-24
Attending: INTERNAL MEDICINE | Admitting: STUDENT IN AN ORGANIZED HEALTH CARE EDUCATION/TRAINING PROGRAM
Payer: COMMERCIAL

## 2020-11-18 VITALS
HEART RATE: 115 BPM | SYSTOLIC BLOOD PRESSURE: 127 MMHG | RESPIRATION RATE: 20 BRPM | DIASTOLIC BLOOD PRESSURE: 80 MMHG | OXYGEN SATURATION: 95 % | HEIGHT: 63 IN | TEMPERATURE: 98 F

## 2020-11-18 DIAGNOSIS — Z90.710 ACQUIRED ABSENCE OF BOTH CERVIX AND UTERUS: Chronic | ICD-10-CM

## 2020-11-18 DIAGNOSIS — D16.9 BENIGN NEOPLASM OF BONE AND ARTICULAR CARTILAGE, UNSPECIFIED: Chronic | ICD-10-CM

## 2020-11-18 DIAGNOSIS — Z87.2 PERSONAL HISTORY OF DISEASES OF THE SKIN AND SUBCUTANEOUS TISSUE: Chronic | ICD-10-CM

## 2020-11-18 PROCEDURE — 93010 ELECTROCARDIOGRAM REPORT: CPT

## 2020-11-18 PROCEDURE — 71045 X-RAY EXAM CHEST 1 VIEW: CPT | Mod: 26

## 2020-11-18 PROCEDURE — 99285 EMERGENCY DEPT VISIT HI MDM: CPT

## 2020-11-18 RX ORDER — ALBUTEROL 90 UG/1
2 AEROSOL, METERED ORAL
Refills: 0 | Status: COMPLETED | OUTPATIENT
Start: 2020-11-18 | End: 2020-11-19

## 2020-11-18 RX ORDER — AZTREONAM 2 G
2000 VIAL (EA) INJECTION ONCE
Refills: 0 | Status: COMPLETED | OUTPATIENT
Start: 2020-11-18 | End: 2020-11-18

## 2020-11-18 RX ORDER — LINEZOLID 600 MG/300ML
600 INJECTION, SOLUTION INTRAVENOUS ONCE
Refills: 0 | Status: COMPLETED | OUTPATIENT
Start: 2020-11-18 | End: 2020-11-18

## 2020-11-18 RX ORDER — SODIUM CHLORIDE 9 MG/ML
2000 INJECTION INTRAMUSCULAR; INTRAVENOUS; SUBCUTANEOUS ONCE
Refills: 0 | Status: COMPLETED | OUTPATIENT
Start: 2020-11-18 | End: 2020-11-18

## 2020-11-18 NOTE — ED ADULT TRIAGE NOTE - CHIEF COMPLAINT QUOTE
Pt c/o mild fevers at home and new onset chest paint. Pt currently on chemo for Stage IV Lung Cancer, last treatment 11/10/2020. States chest pain began after treatment.

## 2020-11-18 NOTE — ED PROVIDER NOTE - OBJECTIVE STATEMENT
66yo F with stage IV lung CA former smoker, on premetexed/carboplatin/pemnrolizumab chemo therapy last 11/10, last 2 days with low gerade temps today to 101, and today with left chest/upper abd pain 10/10 at times, worse with isrrael on that side and exertion. no vomtiing/diarrhea. recent admission for celluitis Lt LE-rsolved, now with celluiltis rt LE on PO doxy. no h/o DVT/PE- had negative duplex during admission. on home O2 1L at baseline. no sick contacts. no HA/neck pain. no  sx.

## 2020-11-18 NOTE — ED PROVIDER NOTE - CLINICAL SUMMARY MEDICAL DECISION MAKING FREE TEXT BOX
patient with fevers on chemo, worsening SOB/cough. possible PNA/viral/COVID will r/o PE. complaint of abd pain but no vomit/diarrhea, due to pain will ct a/p. abx. fluids. steroids/albuterol TBA

## 2020-11-18 NOTE — ED PROVIDER NOTE - PHYSICAL EXAMINATION
Gen: NAD, AOx3, on 2L NC  Head: NCAT  HEENT: EOMI, oral mucosa moist, normal conjunctiva, neck supple  Lung: aleisha crackles b/l basesa Lt > rt, no respiratory distress  CV: tachy regualr, no murmur, Normal perfusion  Abd: soft, diffuse abd ttp withour reboudn/guarding  MSK: No edema, no visible deformities  Neuro: No focal neurologic deficits  Skin: patches of nonblanchable erythema to rt foot no increased warmth, no drainage  Psych: normal affect

## 2020-11-18 NOTE — ED PROVIDER NOTE - PSH
H/O cyst of breast  removed with biopsy 1980  H/O total hysterectomy  2005  Osteochondroma of bone  left lower extremity s/p surgery at 15 years of age

## 2020-11-19 DIAGNOSIS — I26.99 OTHER PULMONARY EMBOLISM WITHOUT ACUTE COR PULMONALE: ICD-10-CM

## 2020-11-19 DIAGNOSIS — R09.89 OTHER SPECIFIED SYMPTOMS AND SIGNS INVOLVING THE CIRCULATORY AND RESPIRATORY SYSTEMS: ICD-10-CM

## 2020-11-19 PROBLEM — C34.90 MALIGNANT NEOPLASM OF UNSPECIFIED PART OF UNSPECIFIED BRONCHUS OR LUNG: Chronic | Status: ACTIVE | Noted: 2020-10-25

## 2020-11-19 PROBLEM — S92.009A UNSPECIFIED FRACTURE OF UNSPECIFIED CALCANEUS, INITIAL ENCOUNTER FOR CLOSED FRACTURE: Chronic | Status: ACTIVE | Noted: 2020-10-25

## 2020-11-19 LAB
ALBUMIN SERPL ELPH-MCNC: 3.5 G/DL — SIGNIFICANT CHANGE UP (ref 3.3–5.2)
ALP SERPL-CCNC: 91 U/L — SIGNIFICANT CHANGE UP (ref 40–120)
ALT FLD-CCNC: 10 U/L — SIGNIFICANT CHANGE UP
ANION GAP SERPL CALC-SCNC: 12 MMOL/L — SIGNIFICANT CHANGE UP (ref 5–17)
APPEARANCE UR: CLEAR — SIGNIFICANT CHANGE UP
APTT BLD: 28.7 SEC — SIGNIFICANT CHANGE UP (ref 27.5–35.5)
AST SERPL-CCNC: 14 U/L — SIGNIFICANT CHANGE UP
BASOPHILS # BLD AUTO: 0 K/UL — SIGNIFICANT CHANGE UP (ref 0–0.2)
BASOPHILS NFR BLD AUTO: 0 % — SIGNIFICANT CHANGE UP (ref 0–2)
BILIRUB SERPL-MCNC: 0.3 MG/DL — LOW (ref 0.4–2)
BILIRUB UR-MCNC: NEGATIVE — SIGNIFICANT CHANGE UP
BUN SERPL-MCNC: 25 MG/DL — HIGH (ref 8–20)
CALCIUM SERPL-MCNC: 9 MG/DL — SIGNIFICANT CHANGE UP (ref 8.6–10.2)
CHLORIDE SERPL-SCNC: 94 MMOL/L — LOW (ref 98–107)
CO2 SERPL-SCNC: 26 MMOL/L — SIGNIFICANT CHANGE UP (ref 22–29)
COLOR SPEC: YELLOW — SIGNIFICANT CHANGE UP
CREAT SERPL-MCNC: 0.9 MG/DL — SIGNIFICANT CHANGE UP (ref 0.5–1.3)
DIFF PNL FLD: NEGATIVE — SIGNIFICANT CHANGE UP
EOSINOPHIL # BLD AUTO: 0.06 K/UL — SIGNIFICANT CHANGE UP (ref 0–0.5)
EOSINOPHIL NFR BLD AUTO: 1.7 % — SIGNIFICANT CHANGE UP (ref 0–6)
GLUCOSE SERPL-MCNC: 114 MG/DL — HIGH (ref 70–99)
GLUCOSE UR QL: NEGATIVE MG/DL — SIGNIFICANT CHANGE UP
HCT VFR BLD CALC: 27.9 % — LOW (ref 34.5–45)
HGB BLD-MCNC: 9.7 G/DL — LOW (ref 11.5–15.5)
INR BLD: 1.21 RATIO — HIGH (ref 0.88–1.16)
KETONES UR-MCNC: NEGATIVE — SIGNIFICANT CHANGE UP
LACTATE BLDV-MCNC: 0.9 MMOL/L — SIGNIFICANT CHANGE UP (ref 0.5–2)
LEUKOCYTE ESTERASE UR-ACNC: NEGATIVE — SIGNIFICANT CHANGE UP
LYMPHOCYTES # BLD AUTO: 1.23 K/UL — SIGNIFICANT CHANGE UP (ref 1–3.3)
LYMPHOCYTES # BLD AUTO: 35.7 % — SIGNIFICANT CHANGE UP (ref 13–44)
MANUAL SMEAR VERIFICATION: SIGNIFICANT CHANGE UP
MCHC RBC-ENTMCNC: 31.6 PG — SIGNIFICANT CHANGE UP (ref 27–34)
MCHC RBC-ENTMCNC: 34.8 GM/DL — SIGNIFICANT CHANGE UP (ref 32–36)
MCV RBC AUTO: 90.9 FL — SIGNIFICANT CHANGE UP (ref 80–100)
MONOCYTES # BLD AUTO: 0.18 K/UL — SIGNIFICANT CHANGE UP (ref 0–0.9)
MONOCYTES NFR BLD AUTO: 5.2 % — SIGNIFICANT CHANGE UP (ref 2–14)
NEUTROPHILS # BLD AUTO: 1.77 K/UL — LOW (ref 1.8–7.4)
NEUTROPHILS NFR BLD AUTO: 51.3 % — SIGNIFICANT CHANGE UP (ref 43–77)
NITRITE UR-MCNC: NEGATIVE — SIGNIFICANT CHANGE UP
OVALOCYTES BLD QL SMEAR: SLIGHT — SIGNIFICANT CHANGE UP
PH UR: 5 — SIGNIFICANT CHANGE UP (ref 5–8)
PLAT MORPH BLD: NORMAL — SIGNIFICANT CHANGE UP
PLATELET # BLD AUTO: 105 K/UL — LOW (ref 150–400)
POIKILOCYTOSIS BLD QL AUTO: SLIGHT — SIGNIFICANT CHANGE UP
POLYCHROMASIA BLD QL SMEAR: SLIGHT — SIGNIFICANT CHANGE UP
POTASSIUM SERPL-MCNC: 3.4 MMOL/L — LOW (ref 3.5–5.3)
POTASSIUM SERPL-SCNC: 3.4 MMOL/L — LOW (ref 3.5–5.3)
PROT SERPL-MCNC: 6.4 G/DL — LOW (ref 6.6–8.7)
PROT UR-MCNC: NEGATIVE MG/DL — SIGNIFICANT CHANGE UP
PROTHROM AB SERPL-ACNC: 13.9 SEC — HIGH (ref 10.6–13.6)
RAPID RVP RESULT: SIGNIFICANT CHANGE UP
RBC # BLD: 3.07 M/UL — LOW (ref 3.8–5.2)
RBC # FLD: 12.5 % — SIGNIFICANT CHANGE UP (ref 10.3–14.5)
RBC BLD AUTO: SIGNIFICANT CHANGE UP
SARS-COV-2 RNA SPEC QL NAA+PROBE: SIGNIFICANT CHANGE UP
SODIUM SERPL-SCNC: 132 MMOL/L — LOW (ref 135–145)
SP GR SPEC: 1.01 — SIGNIFICANT CHANGE UP (ref 1.01–1.02)
UROBILINOGEN FLD QL: NEGATIVE MG/DL — SIGNIFICANT CHANGE UP
VARIANT LYMPHS # BLD: 6.1 % — HIGH (ref 0–6)
WBC # BLD: 3.45 K/UL — LOW (ref 3.8–10.5)
WBC # FLD AUTO: 3.45 K/UL — LOW (ref 3.8–10.5)

## 2020-11-19 PROCEDURE — 93010 ELECTROCARDIOGRAM REPORT: CPT

## 2020-11-19 PROCEDURE — 99223 1ST HOSP IP/OBS HIGH 75: CPT

## 2020-11-19 PROCEDURE — 74177 CT ABD & PELVIS W/CONTRAST: CPT | Mod: 26

## 2020-11-19 PROCEDURE — 71275 CT ANGIOGRAPHY CHEST: CPT | Mod: 26

## 2020-11-19 RX ORDER — ENOXAPARIN SODIUM 100 MG/ML
70 INJECTION SUBCUTANEOUS EVERY 12 HOURS
Refills: 0 | Status: DISCONTINUED | OUTPATIENT
Start: 2020-11-19 | End: 2020-11-24

## 2020-11-19 RX ORDER — ALBUTEROL 90 UG/1
2 AEROSOL, METERED ORAL EVERY 6 HOURS
Refills: 0 | Status: DISCONTINUED | OUTPATIENT
Start: 2020-11-19 | End: 2020-11-24

## 2020-11-19 RX ORDER — MORPHINE SULFATE 50 MG/1
4 CAPSULE, EXTENDED RELEASE ORAL ONCE
Refills: 0 | Status: DISCONTINUED | OUTPATIENT
Start: 2020-11-19 | End: 2020-11-19

## 2020-11-19 RX ORDER — FOLIC ACID 0.8 MG
1 TABLET ORAL DAILY
Refills: 0 | Status: DISCONTINUED | OUTPATIENT
Start: 2020-11-19 | End: 2020-11-24

## 2020-11-19 RX ORDER — GABAPENTIN 400 MG/1
100 CAPSULE ORAL
Refills: 0 | Status: DISCONTINUED | OUTPATIENT
Start: 2020-11-19 | End: 2020-11-24

## 2020-11-19 RX ORDER — ESCITALOPRAM OXALATE 10 MG/1
10 TABLET, FILM COATED ORAL AT BEDTIME
Refills: 0 | Status: DISCONTINUED | OUTPATIENT
Start: 2020-11-19 | End: 2020-11-24

## 2020-11-19 RX ORDER — BUDESONIDE AND FORMOTEROL FUMARATE DIHYDRATE 160; 4.5 UG/1; UG/1
2 AEROSOL RESPIRATORY (INHALATION)
Refills: 0 | Status: DISCONTINUED | OUTPATIENT
Start: 2020-11-19 | End: 2020-11-24

## 2020-11-19 RX ORDER — MORPHINE SULFATE 50 MG/1
5 CAPSULE, EXTENDED RELEASE ORAL EVERY 8 HOURS
Refills: 0 | Status: DISCONTINUED | OUTPATIENT
Start: 2020-11-19 | End: 2020-11-19

## 2020-11-19 RX ORDER — POTASSIUM CHLORIDE 20 MEQ
40 PACKET (EA) ORAL ONCE
Refills: 0 | Status: COMPLETED | OUTPATIENT
Start: 2020-11-19 | End: 2020-11-19

## 2020-11-19 RX ORDER — ENOXAPARIN SODIUM 100 MG/ML
75 INJECTION SUBCUTANEOUS ONCE
Refills: 0 | Status: COMPLETED | OUTPATIENT
Start: 2020-11-19 | End: 2020-11-19

## 2020-11-19 RX ORDER — MORPHINE SULFATE 50 MG/1
15 CAPSULE, EXTENDED RELEASE ORAL EVERY 8 HOURS
Refills: 0 | Status: DISCONTINUED | OUTPATIENT
Start: 2020-11-19 | End: 2020-11-20

## 2020-11-19 RX ORDER — ALPRAZOLAM 0.25 MG
0.5 TABLET ORAL DAILY
Refills: 0 | Status: DISCONTINUED | OUTPATIENT
Start: 2020-11-19 | End: 2020-11-21

## 2020-11-19 RX ORDER — SENNA PLUS 8.6 MG/1
2 TABLET ORAL AT BEDTIME
Refills: 0 | Status: DISCONTINUED | OUTPATIENT
Start: 2020-11-19 | End: 2020-11-24

## 2020-11-19 RX ADMIN — MORPHINE SULFATE 15 MILLIGRAM(S): 50 CAPSULE, EXTENDED RELEASE ORAL at 15:22

## 2020-11-19 RX ADMIN — ALBUTEROL 2 PUFF(S): 90 AEROSOL, METERED ORAL at 01:00

## 2020-11-19 RX ADMIN — ALBUTEROL 2 PUFF(S): 90 AEROSOL, METERED ORAL at 01:20

## 2020-11-19 RX ADMIN — GABAPENTIN 100 MILLIGRAM(S): 400 CAPSULE ORAL at 18:23

## 2020-11-19 RX ADMIN — MORPHINE SULFATE 4 MILLIGRAM(S): 50 CAPSULE, EXTENDED RELEASE ORAL at 01:16

## 2020-11-19 RX ADMIN — Medication 40 MILLIEQUIVALENT(S): at 06:48

## 2020-11-19 RX ADMIN — Medication 100 MILLIGRAM(S): at 00:31

## 2020-11-19 RX ADMIN — ALBUTEROL 2 PUFF(S): 90 AEROSOL, METERED ORAL at 00:13

## 2020-11-19 RX ADMIN — ENOXAPARIN SODIUM 70 MILLIGRAM(S): 100 INJECTION SUBCUTANEOUS at 18:22

## 2020-11-19 RX ADMIN — MORPHINE SULFATE 4 MILLIGRAM(S): 50 CAPSULE, EXTENDED RELEASE ORAL at 02:15

## 2020-11-19 RX ADMIN — ALBUTEROL 2 PUFF(S): 90 AEROSOL, METERED ORAL at 01:49

## 2020-11-19 RX ADMIN — ENOXAPARIN SODIUM 75 MILLIGRAM(S): 100 INJECTION SUBCUTANEOUS at 04:02

## 2020-11-19 RX ADMIN — Medication 50 MILLIGRAM(S): at 13:27

## 2020-11-19 RX ADMIN — SODIUM CHLORIDE 2000 MILLILITER(S): 9 INJECTION INTRAMUSCULAR; INTRAVENOUS; SUBCUTANEOUS at 00:13

## 2020-11-19 RX ADMIN — ALBUTEROL 2 PUFF(S): 90 AEROSOL, METERED ORAL at 15:26

## 2020-11-19 RX ADMIN — Medication 2000 MILLIGRAM(S): at 01:20

## 2020-11-19 RX ADMIN — ESCITALOPRAM OXALATE 10 MILLIGRAM(S): 10 TABLET, FILM COATED ORAL at 22:37

## 2020-11-19 RX ADMIN — GABAPENTIN 100 MILLIGRAM(S): 400 CAPSULE ORAL at 06:48

## 2020-11-19 RX ADMIN — Medication 1 MILLIGRAM(S): at 13:27

## 2020-11-19 RX ADMIN — LINEZOLID 300 MILLIGRAM(S): 600 INJECTION, SOLUTION INTRAVENOUS at 02:15

## 2020-11-19 RX ADMIN — LINEZOLID 600 MILLIGRAM(S): 600 INJECTION, SOLUTION INTRAVENOUS at 03:15

## 2020-11-19 RX ADMIN — Medication 125 MILLIGRAM(S): at 00:13

## 2020-11-19 RX ADMIN — ALBUTEROL 2 PUFF(S): 90 AEROSOL, METERED ORAL at 00:40

## 2020-11-19 RX ADMIN — ALBUTEROL 2 PUFF(S): 90 AEROSOL, METERED ORAL at 20:46

## 2020-11-19 RX ADMIN — SODIUM CHLORIDE 2000 MILLILITER(S): 9 INJECTION INTRAMUSCULAR; INTRAVENOUS; SUBCUTANEOUS at 01:00

## 2020-11-19 RX ADMIN — Medication 0.5 MILLIGRAM(S): at 21:48

## 2020-11-19 NOTE — ED ADULT NURSE NOTE - OBJECTIVE STATEMENT
64yo female AOx4 c/o worsening shortness of breath and fevers s/p chemo for stage IV lung CA. pt also c/o sharp left sided rib pain. RR elevated, pt on 1L NC @ baseline, O2 98% on NC at this time. abd soft and nondistended, denies nausea/vomiting/diarrhea/bloody stools. pt denies urinary symptoms. skin WNL for race. pt admits to recent cellulitis in bilateral feet on doxy antibiotic.

## 2020-11-19 NOTE — ED ADULT NURSE REASSESSMENT NOTE - NS ED NURSE REASSESS COMMENT FT1
MD Alvarez at bedside to discuss CT findings. pt educated on new medication Lovenox. Actual weight obtained. pt verbalizes understanding of plan of care. all questions and concerns addressed. Vital Signs Stable. awaiting bed placement.

## 2020-11-19 NOTE — CONSULT NOTE ADULT - SUBJECTIVE AND OBJECTIVE BOX
65 year old female known patient at [facility] [place], [facility] seeing on behalf of [facility], H/O stage IV NSCLC on Carboplatin + Alimta+ Keytruda last on 11/10 ,recent hospitalization for cellulitis admitted with fever,tachycardia,hypoxia found to have b/l PE.    Seen and examined, resting comfortably.    ROS negative other than HPI    PAST MEDICAL & SURGICAL HISTORY:  Calcaneal fracture    Lung cancer    Lung nodule  multiple nodules pending biopsy    Pneumonia  May 2020 treated outpatient    COPD without exacerbation  no recent hospitalizations    Shoulder pain with history of repair of rotator cuff  2015    Cervical cancer  s/p hysterectomy 2005    H/O total hysterectomy  2005    H/O cyst of breast  removed with biopsy 1980    Osteochondroma of bone  left lower extremity s/p surgery at 15 years of age    Social History:  Denies current etoh/drug/tobacco use (19 Nov 2020 06:15)    FAMILY HISTORY:  FH: COPD (chronic obstructive pulmonary disease)      MEDICATIONS  (STANDING):  ALBUTerol    90 MICROgram(s) HFA Inhaler 2 Puff(s) Inhalation every 6 hours  doxycycline hyclate Capsule 50 milliGRAM(s) Oral every 12 hours  enoxaparin Injectable 70 milliGRAM(s) SubCutaneous every 12 hours  escitalopram 10 milliGRAM(s) Oral at bedtime  folic acid 1 milliGRAM(s) Oral daily  gabapentin 100 milliGRAM(s) Oral two times a day  potassium chloride    Tablet ER 40 milliEquivalent(s) Oral once    MEDICATIONS  (PRN):  ALPRAZolam 0.5 milliGRAM(s) Oral daily PRN anxiety    ICU Vital Signs Last 24 Hrs  T(C): 36.4 (19 Nov 2020 06:35), Max: 36.8 (18 Nov 2020 23:37)  T(F): 97.6 (19 Nov 2020 06:35), Max: 98.3 (18 Nov 2020 23:37)  HR: 78 (19 Nov 2020 06:35) (78 - 115)  BP: 111/70 (19 Nov 2020 06:35) (105/55 - 127/80)  BP(mean): --  ABP: --  ABP(mean): --  RR: 18 (19 Nov 2020 06:35) (17 - 20)  SpO2: 100% (19 Nov 2020 06:35) (95% - 100%)    gen - Alert and oriented  Heart - S1S2 RRR  Lung - dec BS b/l  Abd- soft, distended  Ext - 1+ edema                          9.7    3.45  )-----------( 105      ( 19 Nov 2020 00:06 )             27.9     11-19    132<L>  |  94<L>  |  25.0<H>  ----------------------------<  114<H>  3.4<L>   |  26.0  |  0.90    Ca    9.0      19 Nov 2020 00:06    TPro  6.4<L>  /  Alb  3.5  /  TBili  0.3<L>  /  DBili  x   /  AST  14  /  ALT  10  /  AlkPhos  91  11-19   65 year old female known patient at [facility], [facility] seeing on behalf of [facility], H/O stage IV NSCLC on Carboplatin + Alimta+ Keytruda last on 11/10 ,recent hospitalization for cellulitis admitted with fever,tachycardia,hypoxia found to have b/l PE.    Seen and examined, resting comfortably. on 2l NC, still dyspnic but says breathing has improved.    ROS negative other than HPI    PAST MEDICAL & SURGICAL HISTORY:  Calcaneal fracture    Lung cancer    Lung nodule  multiple nodules pending biopsy    Pneumonia  May 2020 treated outpatient    COPD without exacerbation  no recent hospitalizations    Shoulder pain with history of repair of rotator cuff  2015    Cervical cancer  s/p hysterectomy 2005    H/O total hysterectomy  2005    H/O cyst of breast  removed with biopsy 1980    Osteochondroma of bone  left lower extremity s/p surgery at 15 years of age    Social History:  Denies current etoh/drug/tobacco use (19 Nov 2020 06:15)    FAMILY HISTORY:  FH: COPD (chronic obstructive pulmonary disease)      MEDICATIONS  (STANDING):  ALBUTerol    90 MICROgram(s) HFA Inhaler 2 Puff(s) Inhalation every 6 hours  doxycycline hyclate Capsule 50 milliGRAM(s) Oral every 12 hours  enoxaparin Injectable 70 milliGRAM(s) SubCutaneous every 12 hours  escitalopram 10 milliGRAM(s) Oral at bedtime  folic acid 1 milliGRAM(s) Oral daily  gabapentin 100 milliGRAM(s) Oral two times a day  potassium chloride    Tablet ER 40 milliEquivalent(s) Oral once    MEDICATIONS  (PRN):  ALPRAZolam 0.5 milliGRAM(s) Oral daily PRN anxiety    ICU Vital Signs Last 24 Hrs  T(C): 36.4 (19 Nov 2020 06:35), Max: 36.8 (18 Nov 2020 23:37)  T(F): 97.6 (19 Nov 2020 06:35), Max: 98.3 (18 Nov 2020 23:37)  HR: 78 (19 Nov 2020 06:35) (78 - 115)  BP: 111/70 (19 Nov 2020 06:35) (105/55 - 127/80)  BP(mean): --  ABP: --  ABP(mean): --  RR: 18 (19 Nov 2020 06:35) (17 - 20)  SpO2: 100% (19 Nov 2020 06:35) (95% - 100%)    gen - Alert and oriented  Heart - S1S2 RRR  Lung - dec BS b/l  Abd- soft, distended  Ext - RLE mild erythma/cellulitis noted                          9.7    3.45  )-----------( 105      ( 19 Nov 2020 00:06 )             27.9     11-19    132<L>  |  94<L>  |  25.0<H>  ----------------------------<  114<H>  3.4<L>   |  26.0  |  0.90    Ca    9.0      19 Nov 2020 00:06    TPro  6.4<L>  /  Alb  3.5  /  TBili  0.3<L>  /  DBili  x   /  AST  14  /  ALT  10  /  AlkPhos  91  11-19

## 2020-11-19 NOTE — H&P ADULT - ASSESSMENT
A/P:  Acute PE  -CTA Chest multiple BL PE  -admit to medicine  -monitor telemetry  -repeat EKG  -check ECHO  -check BL Venous Duplex Dopplers  -monitor Troponin/BNP/INR   -Therapeutic Lovenox 70mg/kg Q12 for AC  -Consider Cardiology Consult  -RVP/COVID negative    Hypokalemia  -replace and recheck in AM  -check Mag level    Leukopenia/Anemia  -repeat CBC and trend  -if WBC drops lower consult Oncology     Stage 4 NSC Lung CA  -CTA shows slight interval progression of Lung CA  -s/p Pemetrexed/Carboplatin/Pembrolizumab last dose 11/11/20  -O2 via NC PRN   -Consider Pulm Winsome in AM    -Consider Oncology Consult in AM    LE Foot Cellulitis, L Calcaneal Fx  -nonoperative Fx plan for outpatient ortho F/u  -s/p Vancomycin and outpt Clindamycin on dc for LLE cellulitis now ?RLE Cellulitis  -Would not continue Linezolid due to risk of Serotonin Syndrome with recently increased Lexapro dose   -PCN/Cephalosporin allergy   -Doxycycline 100mg PO Q12 x7d for now given recent chemo  -Check Procalcitonin and CRP and monitor for fever    MDD, Anxiety  -Lexapro 10mg PO Q24  -Xanax 0.5mg PO PRN    Neuropathy  -Neurontin 100mg PO BID    COPD on Home O2  -O2 via NC PRN  -Therapeutic Sub for Breo Ellipta  -Albuterol PRN

## 2020-11-19 NOTE — H&P ADULT - NSHPPHYSICALEXAM_GEN_ALL_CORE
Vital Signs Last 24 Hrs  T(C): 36.6 (19 Nov 2020 03:20), Max: 36.8 (18 Nov 2020 23:37)  T(F): 97.9 (19 Nov 2020 03:20), Max: 98.3 (18 Nov 2020 23:37)  HR: 104 (19 Nov 2020 03:20) (101 - 115)  BP: 105/55 (19 Nov 2020 03:20) (105/55 - 127/80)  BP(mean): --  RR: 18 (19 Nov 2020 03:20) (17 - 20)  SpO2: 98% (19 Nov 2020 03:20) (95% - 98%)    Constitutional: Well-appearing, NAD, VSS  Head: NC/AT  Eyes: PERRL, EOMI, anicteric sclera, conjunctiva WNL  ENT: Normal Pharynx, MMM, No tonsillar exudate/erythema  Neck: Supple, Non-tender  Chest: Non-tender, no rashes  Cardio: +Tachycardia, s1/s2, no appreciable murmurs/rubs/gallops  Resp: Decreased faint crackles BL Bases otherwise clear  Abd: Soft, Non-tender, Non-distended, no rebound/guarding/rigidity  : not examined  Rectal: not examined  MSK: moving all extremities, no motor weakness, full ROM x4  Ext: palpable distal pulses, good capillary refill, +?le erythema  Psych: appropriate, cooperative  Neuro: CN II-XII grossly intact, no focal deficits  Skin: Warm/Dry. No rashes.

## 2020-11-19 NOTE — CONSULT NOTE ADULT - ASSESSMENT
65 year old [place] [facility] [place] [facility] seeing on behalf of [facility], H/O stage IV NSCLC on Carboplatin + Alimta+ Keytruda last on 11/10 ,recent hospitalization for cellulitis admitted with fever,tachycardia,hypoxia found to have b/l PE.    1) PE - ? provoked in setting of recent hospitalization,immobilization and also patient with h/o stage IV malignancy  on Lovenox 1 mg/kg bid  awaiting LE dopplers  2D echo    2) NSCLC - on active chemotherapy. CT scan reviewed   even though mention of lung mass getting slightly larger, might be pseudoprogression as patient on immunotherapy.  upon DC Primary oncology team at [facility] to determine further treatment plan.    3) Anemia - in setting of recent chemotherapy,recent infection  check iron studies,B12,folate  monitor closely while patient on full dose AC    4) Mild leukopenia - not neutropenic  monitor

## 2020-11-19 NOTE — H&P ADULT - HISTORY OF PRESENT ILLNESS
66 y/o F with PMHX NSCLC Stage 4 s/p Chemotherapy Carboplatin/Keytruda last dose 11/11/20, MDD, Anxiety, recent hospitalization for L calcaneal Fx/L Foot Cellulitis s/p Vancomycin and Clindamycin presents to [facility] ER for SOB/GRIMALDO for past few days. EKG sinus tachycardi and CTA positive for multilobar BL acute PE. Pt seen/examined. Denies current complaints. Minimal SOB. No other issues. ROS negative unless mentioned. Denies CP.     Pulm: [facility] -   Surgery: [facility]  Oncology: Italo Lynne 64 y/o F with PMHX NSCLC Stage 4 s/p Chemotherapy Carboplatin/Keytruda/Pemetrexed last dose 11/11/20, MDD, Anxiety, recent hospitalization for L calcaneal Fx/L Foot Cellulitis s/p Vancomycin and Clindamycin presents to [facility] ER for SOB/GRIMALDO for past few days. EKG sinus tachycardi and CTA positive for multilobar BL acute PE. Pt seen/examined. Denies current complaints. Minimal SOB. No other issues. ROS negative unless mentioned. Denies CP.     Pulm: [facility]  Surgery: [facility]  Oncology: Italo Lynne

## 2020-11-20 LAB
ALBUMIN SERPL ELPH-MCNC: 3.3 G/DL — SIGNIFICANT CHANGE UP (ref 3.3–5.2)
ALP SERPL-CCNC: 79 U/L — SIGNIFICANT CHANGE UP (ref 40–120)
ALT FLD-CCNC: 10 U/L — SIGNIFICANT CHANGE UP
ANION GAP SERPL CALC-SCNC: 10 MMOL/L — SIGNIFICANT CHANGE UP (ref 5–17)
AST SERPL-CCNC: 11 U/L — SIGNIFICANT CHANGE UP
BASOPHILS # BLD AUTO: 0.01 K/UL — SIGNIFICANT CHANGE UP (ref 0–0.2)
BASOPHILS NFR BLD AUTO: 0.3 % — SIGNIFICANT CHANGE UP (ref 0–2)
BILIRUB SERPL-MCNC: <0.2 MG/DL — LOW (ref 0.4–2)
BUN SERPL-MCNC: 22 MG/DL — HIGH (ref 8–20)
CALCIUM SERPL-MCNC: 9.1 MG/DL — SIGNIFICANT CHANGE UP (ref 8.6–10.2)
CHLORIDE SERPL-SCNC: 100 MMOL/L — SIGNIFICANT CHANGE UP (ref 98–107)
CK SERPL-CCNC: 26 U/L — SIGNIFICANT CHANGE UP (ref 25–170)
CO2 SERPL-SCNC: 26 MMOL/L — SIGNIFICANT CHANGE UP (ref 22–29)
CREAT SERPL-MCNC: 0.93 MG/DL — SIGNIFICANT CHANGE UP (ref 0.5–1.3)
CRP SERPL-MCNC: 6.97 MG/DL — HIGH (ref 0–0.4)
EOSINOPHIL # BLD AUTO: 0.08 K/UL — SIGNIFICANT CHANGE UP (ref 0–0.5)
EOSINOPHIL NFR BLD AUTO: 2 % — SIGNIFICANT CHANGE UP (ref 0–6)
GLUCOSE SERPL-MCNC: 95 MG/DL — SIGNIFICANT CHANGE UP (ref 70–99)
HCT VFR BLD CALC: 24.7 % — LOW (ref 34.5–45)
HGB BLD-MCNC: 8.2 G/DL — LOW (ref 11.5–15.5)
IMM GRANULOCYTES NFR BLD AUTO: 0 % — SIGNIFICANT CHANGE UP (ref 0–1.5)
INR BLD: 1.14 RATIO — SIGNIFICANT CHANGE UP (ref 0.88–1.16)
LYMPHOCYTES # BLD AUTO: 2.08 K/UL — SIGNIFICANT CHANGE UP (ref 1–3.3)
LYMPHOCYTES # BLD AUTO: 52.9 % — HIGH (ref 13–44)
MAGNESIUM SERPL-MCNC: 1.5 MG/DL — LOW (ref 1.6–2.6)
MCHC RBC-ENTMCNC: 30.6 PG — SIGNIFICANT CHANGE UP (ref 27–34)
MCHC RBC-ENTMCNC: 33.2 GM/DL — SIGNIFICANT CHANGE UP (ref 32–36)
MCV RBC AUTO: 92.2 FL — SIGNIFICANT CHANGE UP (ref 80–100)
MONOCYTES # BLD AUTO: 0.39 K/UL — SIGNIFICANT CHANGE UP (ref 0–0.9)
MONOCYTES NFR BLD AUTO: 9.9 % — SIGNIFICANT CHANGE UP (ref 2–14)
NEUTROPHILS # BLD AUTO: 1.37 K/UL — LOW (ref 1.8–7.4)
NEUTROPHILS NFR BLD AUTO: 34.9 % — LOW (ref 43–77)
NT-PROBNP SERPL-SCNC: 135 PG/ML — SIGNIFICANT CHANGE UP (ref 0–300)
PHOSPHATE SERPL-MCNC: 3 MG/DL — SIGNIFICANT CHANGE UP (ref 2.4–4.7)
PLATELET # BLD AUTO: 72 K/UL — LOW (ref 150–400)
POTASSIUM SERPL-MCNC: 3.5 MMOL/L — SIGNIFICANT CHANGE UP (ref 3.5–5.3)
POTASSIUM SERPL-SCNC: 3.5 MMOL/L — SIGNIFICANT CHANGE UP (ref 3.5–5.3)
PROCALCITONIN SERPL-MCNC: 0.08 NG/ML — SIGNIFICANT CHANGE UP (ref 0.02–0.1)
PROT SERPL-MCNC: 6 G/DL — LOW (ref 6.6–8.7)
PROTHROM AB SERPL-ACNC: 13.1 SEC — SIGNIFICANT CHANGE UP (ref 10.6–13.6)
RBC # BLD: 2.68 M/UL — LOW (ref 3.8–5.2)
RBC # FLD: 12.7 % — SIGNIFICANT CHANGE UP (ref 10.3–14.5)
SARS-COV-2 IGG SERPL QL IA: NEGATIVE — SIGNIFICANT CHANGE UP
SARS-COV-2 IGM SERPL IA-ACNC: 0.09 INDEX — SIGNIFICANT CHANGE UP
SODIUM SERPL-SCNC: 136 MMOL/L — SIGNIFICANT CHANGE UP (ref 135–145)
TROPONIN T SERPL-MCNC: <0.01 NG/ML — SIGNIFICANT CHANGE UP (ref 0–0.06)
WBC # BLD: 3.93 K/UL — SIGNIFICANT CHANGE UP (ref 3.8–10.5)
WBC # FLD AUTO: 3.93 K/UL — SIGNIFICANT CHANGE UP (ref 3.8–10.5)

## 2020-11-20 PROCEDURE — 99233 SBSQ HOSP IP/OBS HIGH 50: CPT

## 2020-11-20 PROCEDURE — 99222 1ST HOSP IP/OBS MODERATE 55: CPT

## 2020-11-20 RX ORDER — MORPHINE SULFATE 50 MG/1
15 CAPSULE, EXTENDED RELEASE ORAL EVERY 6 HOURS
Refills: 0 | Status: DISCONTINUED | OUTPATIENT
Start: 2020-11-20 | End: 2020-11-24

## 2020-11-20 RX ADMIN — SENNA PLUS 2 TABLET(S): 8.6 TABLET ORAL at 18:01

## 2020-11-20 RX ADMIN — ALBUTEROL 2 PUFF(S): 90 AEROSOL, METERED ORAL at 22:09

## 2020-11-20 RX ADMIN — MORPHINE SULFATE 15 MILLIGRAM(S): 50 CAPSULE, EXTENDED RELEASE ORAL at 20:58

## 2020-11-20 RX ADMIN — ESCITALOPRAM OXALATE 10 MILLIGRAM(S): 10 TABLET, FILM COATED ORAL at 20:58

## 2020-11-20 RX ADMIN — Medication 50 MILLIGRAM(S): at 18:01

## 2020-11-20 RX ADMIN — MORPHINE SULFATE 15 MILLIGRAM(S): 50 CAPSULE, EXTENDED RELEASE ORAL at 09:43

## 2020-11-20 RX ADMIN — MORPHINE SULFATE 15 MILLIGRAM(S): 50 CAPSULE, EXTENDED RELEASE ORAL at 00:26

## 2020-11-20 RX ADMIN — BUDESONIDE AND FORMOTEROL FUMARATE DIHYDRATE 2 PUFF(S): 160; 4.5 AEROSOL RESPIRATORY (INHALATION) at 08:50

## 2020-11-20 RX ADMIN — GABAPENTIN 100 MILLIGRAM(S): 400 CAPSULE ORAL at 18:01

## 2020-11-20 RX ADMIN — ALBUTEROL 2 PUFF(S): 90 AEROSOL, METERED ORAL at 08:49

## 2020-11-20 RX ADMIN — GABAPENTIN 100 MILLIGRAM(S): 400 CAPSULE ORAL at 06:02

## 2020-11-20 RX ADMIN — ENOXAPARIN SODIUM 70 MILLIGRAM(S): 100 INJECTION SUBCUTANEOUS at 18:01

## 2020-11-20 RX ADMIN — Medication 50 MILLIGRAM(S): at 06:03

## 2020-11-20 RX ADMIN — MORPHINE SULFATE 15 MILLIGRAM(S): 50 CAPSULE, EXTENDED RELEASE ORAL at 01:26

## 2020-11-20 RX ADMIN — Medication 1 MILLIGRAM(S): at 09:42

## 2020-11-20 RX ADMIN — ALBUTEROL 2 PUFF(S): 90 AEROSOL, METERED ORAL at 03:40

## 2020-11-20 RX ADMIN — Medication 0.5 MILLIGRAM(S): at 13:26

## 2020-11-20 RX ADMIN — ENOXAPARIN SODIUM 70 MILLIGRAM(S): 100 INJECTION SUBCUTANEOUS at 06:03

## 2020-11-20 RX ADMIN — MORPHINE SULFATE 15 MILLIGRAM(S): 50 CAPSULE, EXTENDED RELEASE ORAL at 22:13

## 2020-11-20 NOTE — PROGRESS NOTE ADULT - ASSESSMENT
65 year old female known patient at [facility] [place], [facility] seeing on behalf of [facility], H/O stage IV NSCLC on Carboplatin + Alimta+ Keytruda last on 11/10 ,recent hospitalization for cellulitis admitted with fever,tachycardia,hypoxia found to have b/l PE.    1) PE - ? provoked in setting of recent hospitalization,immobilization and also patient with h/o stage IV malignancy.  States she has been immobile at home  on Lovenox 1 mg/kg bid    2D echo done    2) NSCLC - on active chemotherapy. CT scan reviewed   even though mention of lung mass getting slightly larger, might be pseudoprogression as patient on immunotherapy.  upon DC Primary oncology team at [facility] to determine further treatment plan.    3) Anemia - in setting of recent chemotherapy,recent infection  check iron studies,B12,folate  monitor closely while patient on full dose AC    4) Mild leukopenia - not neutropenic  monitor

## 2020-11-20 NOTE — CONSULT NOTE ADULT - SUBJECTIVE AND OBJECTIVE BOX
[place] [facility]                                                               Office:  [address] [address]                                                              Telephone: [phone]. Fax:[phone]                                                                        CARDIOLOGY CONSULTATION NOTE                                                                                             Consult requested by:  Dr. Orozco  Reason for Consultation: Chest Pain  History obtained by: Patient and medical record   obtained: No    Chief complaint:    Patient is a 65y old  Female who presents with a chief complaint of Acute PE (2020 14:57)        HPI:  64 y/o F with PMHX NSCLC Stage 4 s/p Chemotherapy Carboplatin/Keytruda/Pemetrexed last dose 20, MDD, Anxiety, recent hospitalization for L calcaneal Fx/L Foot Cellulitis s/p Vancomycin and Clindamycin presents to [facility] ER for SOB/GRIMALDO for past few days. EKG sinus tachycardi and CTA positive for multilobar BL acute PE. Pt seen/examined. Denies current complaints. Minimal SOB. No other issues. ROS negative unless mentioned. Denies CP.     Pulm: [facility]  Surgery: [facility]  Oncology: Italo Lynne  (2020 06:15)    Above Appreciated  Cardiology consult requested for chest pain. Pt states that her chest pain occurs with deep inspiration, left sided radiating to the back. Pt also c/o bilateral leg pain and "fluttering" sensation in legs. Pt denies anginal symptoms.            REVIEW OF SYMPTOMS:     CONSTITUTIONAL: No fever, weight loss, or fatigue  ENMT:  No difficulty hearing, tinnitus, vertigo; No sinus or throat pain  NECK: No pain or stiffness  CARDIOVASCULAR: See HPI  RESPIRATORY: No Dyspnea on exertion, Shortness of breath, cough, wheezing  : No dysuria, no hematuria   GI: No dark color stool, no melena, no diarrhea, no constipation, no abdominal pain   NEURO: No headache, no dizziness, no slurred speech   MUSCULOSKELETAL: No joint pain or swelling; No muscle, back, or extremity pain  PSYCH: No agitation, no anxiety.    ALL OTHER REVIEW OF SYSTEMS ARE NEGATIVE.      PREVIOUS DIAGNOSTIC TESTING  ECHO FINDINGS: < from: TTE Echo Complete w/o Contrast w/ Doppler (20 @ 11:48) >  Summary:   1. Left ventricular ejection fraction, by visual estimation, is 55 to 60%.   2. Normal global left ventricular systolic function.   3. Spectral Doppler shows impaired relaxation pattern of left ventricular myocardial filling (Grade I diastolic dysfunction).   4. There is no evidence of pericardial effusion.   5. Trace mitral valve regurgitation.      ALLERGIES: Allergies    amoxicillin (Unknown)  Keflex (Unknown)  penicillin (Unknown)  penicillins (Other)    Intolerances      PAST MEDICAL HISTORY  Calcaneal fracture    Lung cancer    Lung nodule    Pneumonia    COPD without exacerbation    Shoulder pain with history of repair of rotator cuff    Cervical cancer    No pertinent past medical history        PAST SURGICAL HISTORY  H/O total hysterectomy    H/O: hysterectomy    H/O cyst of breast    Osteochondroma of bone    Shoulder pain with history of repair of rotator cuff    No significant past surgical history        FAMILY HISTORY:  FH: COPD (chronic obstructive pulmonary disease)        SOCIAL HISTORY:    CIGARETTES: quit 5 years ago used to smoke 1 ppd    ALCOHOL: weekend drinking   DRUGS: Denies       CURRENT MEDICATIONS:  ALBUTerol    90 MICROgram(s) HFA Inhaler  budesonide 160 MICROgram(s)/formoterol 4.5 MICROgram(s) Inhaler   escitalopram  gabapentin  doxycycline hyclate Capsule  enoxaparin Injectable  folic acid        HOME MEDICATIONS:    acetaminophen 325 mg oral tablet: 2 tab(s) orally every 6 hours, As needed, Temp greater or equal to 38C (100.4F), Mild Pain (1 - 3), Moderate Pain (4 - 6) (29 Oct 2020 17:15)  Albuterol (Eqv-ProAir HFA) 90 mcg/inh inhalation aerosol: 2 puff(s) inhaled every 6 hours (26 Oct 2020 05:25)  Breo Ellipta 100 mcg-25 mcg/inh inhalation powder: 1 puff(s) inhaled once a day (26 Oct 2020 05:25)  folic acid 1 mg oral tablet: 1 tab(s) orally once a day (29 Oct 2020 17:15)  Lexapro 5 mg oral tablet: 2 tab(s) orally once a day (at bedtime) (29 Oct 2020 17:15)  Senna: 2 tab(s) orally once a day (at bedtime) (26 Oct 2020 05:25)      Vital Signs Last 24 Hrs  T(C): 36.8 (2020 05:58), Max: 36.8 (2020 05:58)  T(F): 98.2 (2020 05:58), Max: 98.2 (2020 05:58)  HR: 95 (2020 13:38) (79 - 116)  BP: 100/67 (2020 05:58) (100/67 - 101/61)  RR: 15 (2020 05:58) (15 - 16)  SpO2: 92% (2020 13:38) (90% - 98%)      PHYSICAL EXAM:  Constitutional: Comfortable . No acute distress.   HEENT: Atraumatic and normocephalic , neck is supple . no JVD. No carotid bruit. PEERL   CNS: A&Ox3, No focal deficits. EOMI  Lymph Nodes: Cervical : Not palpable  Respiratory: course breath sounds, +productive coughing  Cardiovascular: S1S2 RRR. No murmur/rubs or gallop  Gastrointestinal: Soft non-tender and non distended, +Bowel sounds. negative Ngo's sign   Extremities: Trace edema   Psychiatric: Calm . no agitation  Skin: Ecchymosis on right foot      Intake and output:   19 @ 07:  -   @ 07:00  --------------------------------------------------------  IN: 0 mL / OUT: 650 mL / NET: -650 mL     @ 07:01  -   @ 16:12  --------------------------------------------------------  IN: 560 mL / OUT: 600 mL / NET: -40 mL        LABS:                        8.2    3.93  )-----------( 72       ( 2020 06:56 )             24.7     11    136  |  100  |  22.0<H>  ----------------------------<  95  3.5   |  26.0  |  0.93    Ca    9.1      2020 06:56  Phos  3.0     11-20  Mg     1.5         TPro  6.0<L>  /  Alb  3.3  /  TBili  <0.2<L>  /  DBili  x   /  AST  11  /  ALT  10  /  AlkPhos  79  11-20    CARDIAC MARKERS ( 2020 06:56 )  x     / <0.01 ng/mL / 26 U/L / x     / x      CARDIAC MARKERS ( 2020 00:06 )  x     / <0.01 ng/mL / x     / x     / x        ;p-BNP=Serum Pro-Brain Natriuretic Peptide: 135 pg/mL ( @ 06:56)  Serum Pro-Brain Natriuretic Peptide: 33 pg/mL ( @ 00:06)    PT/INR - ( 2020 06:56 )   PT: 13.1 sec;   INR: 1.14 ratio         PTT - ( 2020 00:06 )  PTT:28.7 sec  Urinalysis Basic - ( 2020 03:40 )    Color: Yellow / Appearance: Clear / S.010 / pH: x  Gluc: x / Ketone: Negative  / Bili: Negative / Urobili: Negative mg/dL   Blood: x / Protein: Negative mg/dL / Nitrite: Negative   Leuk Esterase: Negative / RBC: x / WBC x   Sq Epi: x / Non Sq Epi: x / Bacteria: x        INTERPRETATION OF TELEMETRY: NSR HR @ 90s     ECG: ST HR @ 102    RADIOLOGY & ADDITIONAL STUDIES:    X-ray:  < from: Xray Chest 1 View- PORTABLE-Urgent (20 @ 23:39) >  Shallow inspiration crowds the chest.    Heart magnified by technique.    2 cm left upper outer lung nodule is similar to .    Present film shows slight increase in interstitial pattern which may be exacerbated by shallow inspiration and chest is similar to .    IMPRESSION: Unchanged chest as above.    CT scan: < from: CT Angio Chest w/ IV Cont (20 @ 01:33) >    IMPRESSION:    Bilateral pulmonary emboli as detailed above.    Slight interval increase in size of poorly defined left upper lobe opacity measuring 3 x 1.8 cm previously measuring 2.9 x 1.6 cm. likely in keeping with slight progression of malignancy or metastatic disease. Short term pulmonary imaging follow-up in 3-6 month is advised to demonstrate stability. Stable additional left upper lobe opacity measuring 1.7x 0.6 cm centimeters fissure. Trace left pleural effusion or increased left pleural parenchymal thickening.    No acute intraabdominal findings.    Reidentified right adrenal nodule measuring up to 1.7 cm, previously measuring up to 1.5 similar measurement.    Additional findings as mentioned above.         [place] [facility]                                                               Office:  [address] [address]                                                              Telephone: [phone]. Fax:[phone]                                                                        CARDIOLOGY CONSULTATION NOTE                                                                                             Consult requested by:  Dr. Orozco  Reason for Consultation: Chest Pain  History obtained by: Patient and medical record   obtained: No    Chief complaint:    Patient is a 65y old  Female who presents with a chief complaint of Acute PE (2020 14:57)        HPI:  64 y/o F with PMHX NSCLC Stage 4 s/p Chemotherapy Carboplatin/Keytruda/Pemetrexed last dose 20, MDD, Anxiety, recent hospitalization for L calcaneal Fx/L Foot Cellulitis s/p Vancomycin and Clindamycin presents to [facility] ER for SOB/GRIMALDO for past few days. EKG sinus tachycardi and CTA positive for multilobar BL acute PE. Pt seen/examined. Denies current complaints. Minimal SOB. No other issues. ROS negative unless mentioned. Denies CP.     Pulm: [facility]  Surgery: [facility]  Oncology: Italo Lynne  (2020 06:15)    Above Appreciated  Cardiology consult requested for chest pain. Pt states that her chest pain occurs with deep inspiration, left sided radiating to the back. Pt also c/o bilateral leg pain and "fluttering" sensation in legs. Pt denies anginal symptoms.            REVIEW OF SYMPTOMS:     CONSTITUTIONAL: No fever, weight loss, or fatigue  ENMT:  No difficulty hearing, tinnitus, vertigo; No sinus or throat pain  NECK: No pain or stiffness  CARDIOVASCULAR: See HPI  RESPIRATORY: No Dyspnea on exertion, Shortness of breath, cough, wheezing  : No dysuria, no hematuria   GI: No dark color stool, no melena, no diarrhea, no constipation, no abdominal pain   NEURO: No headache, no dizziness, no slurred speech   MUSCULOSKELETAL: No joint pain or swelling; No muscle, back, or extremity pain  PSYCH: No agitation, no anxiety.    ALL OTHER REVIEW OF SYSTEMS ARE NEGATIVE.      PREVIOUS DIAGNOSTIC TESTING  ECHO FINDINGS: < from: TTE Echo Complete w/o Contrast w/ Doppler (20 @ 11:48) >  Summary:   1. Left ventricular ejection fraction, by visual estimation, is 55 to 60%.   2. Normal global left ventricular systolic function.   3. Spectral Doppler shows impaired relaxation pattern of left ventricular myocardial filling (Grade I diastolic dysfunction).   4. There is no evidence of pericardial effusion.   5. Trace mitral valve regurgitation.      ALLERGIES: Allergies    amoxicillin (Unknown)  Keflex (Unknown)  penicillin (Unknown)  penicillins (Other)    Intolerances      PAST MEDICAL HISTORY  Calcaneal fracture    Lung cancer    Lung nodule    Pneumonia    COPD without exacerbation    Shoulder pain with history of repair of rotator cuff    Cervical cancer    No pertinent past medical history        PAST SURGICAL HISTORY  H/O total hysterectomy    H/O: hysterectomy    H/O cyst of breast    Osteochondroma of bone    Shoulder pain with history of repair of rotator cuff    No significant past surgical history        FAMILY HISTORY:  FH: COPD (chronic obstructive pulmonary disease)        SOCIAL HISTORY:    CIGARETTES: quit 5 years ago used to smoke 1 ppd    ALCOHOL: weekend drinking   DRUGS: Denies       CURRENT MEDICATIONS:  ALBUTerol    90 MICROgram(s) HFA Inhaler  budesonide 160 MICROgram(s)/formoterol 4.5 MICROgram(s) Inhaler   escitalopram  gabapentin  doxycycline hyclate Capsule  enoxaparin Injectable  folic acid        HOME MEDICATIONS:    acetaminophen 325 mg oral tablet: 2 tab(s) orally every 6 hours, As needed, Temp greater or equal to 38C (100.4F), Mild Pain (1 - 3), Moderate Pain (4 - 6) (29 Oct 2020 17:15)  Albuterol (Eqv-ProAir HFA) 90 mcg/inh inhalation aerosol: 2 puff(s) inhaled every 6 hours (26 Oct 2020 05:25)  Breo Ellipta 100 mcg-25 mcg/inh inhalation powder: 1 puff(s) inhaled once a day (26 Oct 2020 05:25)  folic acid 1 mg oral tablet: 1 tab(s) orally once a day (29 Oct 2020 17:15)  Lexapro 5 mg oral tablet: 2 tab(s) orally once a day (at bedtime) (29 Oct 2020 17:15)  Senna: 2 tab(s) orally once a day (at bedtime) (26 Oct 2020 05:25)      Vital Signs Last 24 Hrs  T(C): 36.8 (2020 05:58), Max: 36.8 (2020 05:58)  T(F): 98.2 (2020 05:58), Max: 98.2 (2020 05:58)  HR: 95 (2020 13:38) (79 - 116)  BP: 100/67 (2020 05:58) (100/67 - 101/61)  RR: 15 (2020 05:58) (15 - 16)  SpO2: 92% (2020 13:38) (90% - 98%)      PHYSICAL EXAM:  Constitutional: Comfortable . No acute distress.   HEENT: Atraumatic and normocephalic , neck is supple . no JVD. No carotid bruit. PEERL   CNS: A&Ox3, No focal deficits. EOMI  Lymph Nodes: Cervical : Not palpable  Respiratory: course breath sounds, +productive coughing  Cardiovascular: S1S2 RRR. No murmur/rubs or gallop  Gastrointestinal: Soft non-tender and non distended, +Bowel sounds. negative Ngo's sign   Extremities: Trace edema   Psychiatric: Calm . no agitation  Skin: Ecchymosis on right foot      Intake and output:   19 @ 07:  -   @ 07:00  --------------------------------------------------------  IN: 0 mL / OUT: 650 mL / NET: -650 mL     @ 07:01  -   @ 16:12  --------------------------------------------------------  IN: 560 mL / OUT: 600 mL / NET: -40 mL        LABS:                        8.2    3.93  )-----------( 72       ( 2020 06:56 )             24.7     11    136  |  100  |  22.0<H>  ----------------------------<  95  3.5   |  26.0  |  0.93    Ca    9.1      2020 06:56  Phos  3.0     11-20  Mg     1.5         TPro  6.0<L>  /  Alb  3.3  /  TBili  <0.2<L>  /  DBili  x   /  AST  11  /  ALT  10  /  AlkPhos  79  11-20    CARDIAC MARKERS ( 2020 06:56 )  x     / <0.01 ng/mL / 26 U/L / x     / x      CARDIAC MARKERS ( 2020 00:06 )  x     / <0.01 ng/mL / x     / x     / x        ;p-BNP=Serum Pro-Brain Natriuretic Peptide: 135 pg/mL ( @ 06:56)  Serum Pro-Brain Natriuretic Peptide: 33 pg/mL ( @ 00:06)    PT/INR - ( 2020 06:56 )   PT: 13.1 sec;   INR: 1.14 ratio         PTT - ( 2020 00:06 )  PTT:28.7 sec  Urinalysis Basic - ( 2020 03:40 )    Color: Yellow / Appearance: Clear / S.010 / pH: x  Gluc: x / Ketone: Negative  / Bili: Negative / Urobili: Negative mg/dL   Blood: x / Protein: Negative mg/dL / Nitrite: Negative   Leuk Esterase: Negative / RBC: x / WBC x   Sq Epi: x / Non Sq Epi: x / Bacteria: x        INTERPRETATION OF TELEMETRY: NSR HR @ 90s     ECG: ST HR @ 102    RADIOLOGY & ADDITIONAL STUDIES:    X-ray:  < from: Xray Chest 1 View- PORTABLE-Urgent (20 @ 23:39) >  Shallow inspiration crowds the chest.    Heart magnified by technique.    2 cm left upper outer lung nodule is similar to .    Present film shows slight increase in interstitial pattern which may be exacerbated by shallow inspiration and chest is similar to .    IMPRESSION: Unchanged chest as above.    CT scan: < from: CT Angio Chest w/ IV Cont (20 @ 01:33) >    IMPRESSION:    Bilateral pulmonary emboli as detailed above.    Slight interval increase in size of poorly defined left upper lobe opacity measuring 3 x 1.8 cm previously measuring 2.9 x 1.6 cm. likely in keeping with slight progression of malignancy or metastatic disease. Short term pulmonary imaging follow-up in 3-6 month is advised to demonstrate stability. Stable additional left upper lobe opacity measuring 1.7x 0.6 cm centimeters fissure. Trace left pleural effusion or increased left pleural parenchymal thickening.    No acute intraabdominal findings.    Reidentified right adrenal nodule measuring up to 1.7 cm, previously measuring up to 1.5 similar measurement.    Additional findings as mentioned above.

## 2020-11-20 NOTE — PROGRESS NOTE ADULT - SUBJECTIVE AND OBJECTIVE BOX
65 year old female known patient at [facility] [place], [facility] seeing on behalf of [facility], H/O stage IV NSCLC on Carboplatin + Alimta+ Keytruda last on 11/10 ,recent hospitalization for cellulitis admitted with fever,tachycardia,hypoxia found to have b/l PE.    Seen and examined, resting comfortably. on 2l NC, still dyspnic but says breathing has improved. Still c/o pleuritic chest pain; slightly better than yesterday.  Also notes both feet are swollen and right foot is erythematous.    ROS negative other than HPI    PAST MEDICAL & SURGICAL HISTORY:  Calcaneal fracture    Lung cancer    Lung nodule  multiple nodules pending biopsy    Pneumonia  May 2020 treated outpatient    COPD without exacerbation  no recent hospitalizations    Shoulder pain with history of repair of rotator cuff  2015    Cervical cancer  s/p hysterectomy 2005    H/O total hysterectomy  2005    H/O cyst of breast  removed with biopsy 1980    Osteochondroma of bone  left lower extremity s/p surgery at 15 years of age    Social History:  Denies current etoh/drug/tobacco use (19 Nov 2020 06:15)    FAMILY HISTORY:  FH: COPD (chronic obstructive pulmonary disease)      MEDICATIONS  (STANDING):  ALBUTerol    90 MICROgram(s) HFA Inhaler 2 Puff(s) Inhalation every 6 hours  doxycycline hyclate Capsule 50 milliGRAM(s) Oral every 12 hours  enoxaparin Injectable 70 milliGRAM(s) SubCutaneous every 12 hours  escitalopram 10 milliGRAM(s) Oral at bedtime  folic acid 1 milliGRAM(s) Oral daily  gabapentin 100 milliGRAM(s) Oral two times a day  potassium chloride    Tablet ER 40 milliEquivalent(s) Oral once    MEDICATIONS  (PRN):  ALPRAZolam 0.5 milliGRAM(s) Oral daily PRN anxiety    ICU Vital Signs Last 24 Hrs  T(C): 36.4 (19 Nov 2020 06:35), Max: 36.8 (18 Nov 2020 23:37)  T(F): 97.6 (19 Nov 2020 06:35), Max: 98.3 (18 Nov 2020 23:37)  HR: 78 (19 Nov 2020 06:35) (78 - 115)  BP: 111/70 (19 Nov 2020 06:35) (105/55 - 127/80)  BP(mean): --  ABP: --  ABP(mean): --  RR: 18 (19 Nov 2020 06:35) (17 - 20)  SpO2: 100% (19 Nov 2020 06:35) (95% - 100%)    gen - Alert and oriented  Heart - S1S2 RRR  Lung - dec BS b/l  Abd- soft, distended  Ext - RLE mild erythma/cellulitis noted                          9.7    3.45  )-----------( 105      ( 19 Nov 2020 00:06 )             27.9     11-19    132<L>  |  94<L>  |  25.0<H>  ----------------------------<  114<H>  3.4<L>   |  26.0  |  0.90    Ca    9.0      19 Nov 2020 00:06    TPro  6.4<L>  /  Alb  3.5  /  TBili  0.3<L>  /  DBili  x   /  AST  14  /  ALT  10  /  AlkPhos  91  11-19

## 2020-11-20 NOTE — PROGRESS NOTE ADULT - ASSESSMENT
Acute PE  -CTA Chest multiple BL PE  -no Rv strain  on ECHO  -check BL Venous Duplex Dopplers  -neg Troponin   -Therapeutic Lovenox 70mg/kg Q12 for AC  -Cardiology Consult  -RVP/COVID negative    Hypokalemia  -replace and recheck in AM  -check Mag level    Leukopenia/Anemia  appreciate Oncology     Stage 4 NSC Lung CA  -CTA shows slight interval progression of Lung CA  -s/p Pemetrexed/Carboplatin/Pembrolizumab last dose 11/11/20  -O2 via NC PRN   - Pulm Winsome     LE Foot Cellulitis, L Calcaneal Fx  -nonoperative Fx plan for outpatient ortho F/u  -s/p Vancomycin and outpt Clindamycin on dc for LLE cellulitis now ?RLE Cellulitis- improved   -Would not continue Linezolid due to risk of Serotonin Syndrome with recently increased Lexapro dose   -PCN/Cephalosporin allergy   -Doxycycline 100mg PO Q12 x7d for now given recent chemo  -Check Procalcitonin and CRP and monitor for fever    MDD Anxiety--Lexapro 10mg PO Q24  -Xanax 0.5mg PO PRN    Neuropathy  -Neurontin 100mg PO BID    COPD on Home O2  -O2 via NC PRN  -Therapeutic Sub for Breo Ellipta  -Albuterol PRN

## 2020-11-20 NOTE — PROGRESS NOTE ADULT - SUBJECTIVE AND OBJECTIVE BOX
TAMAR LIM Patient is a 65y old  Female who presents with a chief complaint of Acute PE (2020 09:04)     HPI:  66 y/o F with PMHX NSCLC Stage 4 s/p Chemotherapy Carboplatin/Keytruda/Pemetrexed last dose 20, MDD, Anxiety, recent hospitalization for L calcaneal Fx/L Foot Cellulitis s/p Vancomycin and Clindamycin presents to [facility] ER for SOB/GRIMALDO for past few days. EKG sinus tachycardi and CTA positive for multilobar BL acute PE. Pt seen/examined. Denies current complaints. Minimal SOB. No other issues. ROS negative unless mentioned. Denies CP.     [facility]  [facility]  Oncology: Italo Lynne  (2020 06:15)    The patient was seen and evaluated   The patient is in no acute distress.  Denied any fever abdominal pain, fever, dysuria, cough, edema   Complains of chest pain, palpitations, shortness of breath, - requests a cardiologist evaluation - called     I&O's Summary    2020 07:01  -  2020 07:00  --------------------------------------------------------  IN: 0 mL / OUT: 650 mL / NET: -650 mL      Allergies    amoxicillin (Unknown)  Keflex (Unknown)  penicillin (Unknown)  penicillins (Other)    Intolerances      HEALTH ISSUES - PROBLEM Dx:  Pulmonary thromboembolism    Suspected deep vein thrombosis (DVT)          PAST MEDICAL & SURGICAL HISTORY:  Calcaneal fracture    Lung cancer    Lung nodule  multiple nodules pending biopsy    Pneumonia  May 2020 treated outpatient    COPD without exacerbation  no recent hospitalizations    Shoulder pain with history of repair of rotator cuff  2015    Cervical cancer  s/p hysterectomy     H/O total hysterectomy      H/O cyst of breast  removed with biopsy     Osteochondroma of bone  left lower extremity s/p surgery at 15 years of age            Vital Signs Last 24 Hrs  T(C): 36.8 (2020 05:58), Max: 36.8 (2020 05:58)  T(F): 98.2 (2020 05:58), Max: 98.2 (2020 05:58)  HR: 95 (2020 13:38) (79 - 116)  BP: 100/67 (2020 05:58) (100/67 - 103/54)  BP(mean): --  RR: 15 (2020 05:58) (15 - 18)  SpO2: 92% (2020 13:38) (90% - 100%)T(C): 36.8 (20 @ 05:58), Max: 36.8 (20 @ 05:58)  HR: 95 (20 @ 13:38) (79 - 116)  BP: 100/67 (20 @ 05:58) (100/67 - 103/54)  RR: 15 (20 @ 05:58) (15 - 18)  SpO2: 92% (20 @ 13:38) (90% - 100%)  Wt(kg): --    PHYSICAL EXAM:    GENERAL: NAD, complains of foot pain on walking , complains of chest pain and SOB  HEAD:  Atraumatic, Normocephalic  NERVOUS SYSTEM:  Alert & Oriented X3,  Moves upper and lower extremities; CNS-II-XII  CHEST/LUNG: Clear to auscultation bilaterally; No rales, rhonchi, wheezing,   HEART: Regular rate and rhythm; No murmurs,   ABDOMEN: Soft, Nontender, Nondistended; Bowel sounds present  EXTREMITIES:  Peripheral Pulses, No  cyanosis, or edema  SKIN: redness much better on the left foot   psychiatry- mood and affect anxious  Insight and judgement intact     ALBUTerol    90 MICROgram(s) HFA Inhaler 2 Puff(s) Inhalation every 6 hours  ALPRAZolam 0.5 milliGRAM(s) Oral daily PRN  budesonide 160 MICROgram(s)/formoterol 4.5 MICROgram(s) Inhaler 2 Puff(s) Inhalation two times a day  doxycycline hyclate Capsule 50 milliGRAM(s) Oral every 12 hours  enoxaparin Injectable 70 milliGRAM(s) SubCutaneous every 12 hours  escitalopram 10 milliGRAM(s) Oral at bedtime  folic acid 1 milliGRAM(s) Oral daily  gabapentin 100 milliGRAM(s) Oral two times a day  morphine  IR 15 milliGRAM(s) Oral every 8 hours PRN  senna 2 Tablet(s) Oral at bedtime PRN      LABS:                          8.2    3.93  )-----------( 72       ( 2020 06:56 )             24.7     11-    136  |  100  |  22.0<H>  ----------------------------<  95  3.5   |  26.0  |  0.93    Ca    9.1      2020 06:56  Phos  3.0     11-20  Mg     1.5     -    TPro  6.0<L>  /  Alb  3.3  /  TBili  <0.2<L>  /  DBili  x   /  AST  11  /  ALT  10  /  AlkPhos  79  11-20    LIVER FUNCTIONS - ( 2020 06:56 )  Alb: 3.3 g/dL / Pro: 6.0 g/dL / ALK PHOS: 79 U/L / ALT: 10 U/L / AST: 11 U/L / GGT: x           PT/INR - ( 2020 06:56 )   PT: 13.1 sec;   INR: 1.14 ratio         PTT - ( 2020 00:06 )  PTT:28.7 sec  CARDIAC MARKERS ( 2020 06:56 )  x     / <0.01 ng/mL / 26 U/L / x     / x      CARDIAC MARKERS ( 2020 00:06 )  x     / <0.01 ng/mL / x     / x     / x          Urinalysis Basic - ( 2020 03:40 )    Color: Yellow / Appearance: Clear / S.010 / pH: x  Gluc: x / Ketone: Negative  / Bili: Negative / Urobili: Negative mg/dL   Blood: x / Protein: Negative mg/dL / Nitrite: Negative   Leuk Esterase: Negative / RBC: x / WBC x   Sq Epi: x / Non Sq Epi: x / Bacteria: x      CAPILLARY BLOOD GLUCOSE          RADIOLOGY & ADDITIONAL TESTS:      Consultant notes reviewed    Case discussed with consultant/provider/ family /patient

## 2020-11-20 NOTE — CONSULT NOTE ADULT - ASSESSMENT
64 y/o F with PMHX NSCLC Stage 4 s/p Chemotherapy Carboplatin/Keytruda/Pemetrexed last dose 11/11/20, MDD, Anxiety, recent hospitalization for L calcaneal Fx/L Foot Cellulitis s/p Vancomycin and Clindamycin presents to [facility] ER for SOB/GRIMALDO for past few days. EKG sinus tachycardi and CTA positive for multilobar BL acute PE. Pt seen/examined. Denies current complaints. Minimal SOB. No other issues. ROS negative unless mentioned. Denies CP.     Pulm: [facility] [place]  Surgery: [facility]  Oncology: Italo Lynne  (19 Nov 2020 06:15)    Above Appreciated  Cardiology consult requested for chest pain. Pt states that her chest pain occurs with deep inspiration, left sided radiating to the back. Pt also c/o bilateral leg pain and "fluttering" sensation in legs. Pt denies anginal symptoms.      Chest Pain  -ECG-ST HR @ 102  -Tropx2- negative  -CT Angio Chest-Bilateral pulmonary emboli as detailed above. Slight interval increase in size of poorly defined left upper lobe opacity measuring 3 x 1.8 cm previously measuring 2.9 x 1.6 cm. likely in keeping with slight progression of malignancy or metastatic disease. Short term pulmonary imaging follow-up in 3-6 month is advised to demonstrate stability. Stable additional left upper lobe opacity measuring 1.7x 0.6 cm centimeters fissure. Trace left pleural effusion or increased left pleural parenchymal thickening  -Chest Pain likely d/t chest malignancy and dx of PE. Pt denies having anginal symptoms and only has chest pain with deep inspiration  -Cont current medication regimen with Lovenox with plan for transitioning to PO AC as outpt     Phlebitis  -Pt has hx of phlebitis   -Currently c/o leg pain and "fluttering sensation in legs"  -Pedal Pulses +2/+2  -Vascular Consult recommended 66 y/o F with PMHX NSCLC Stage 4 s/p Chemotherapy Carboplatin/Keytruda/Pemetrexed last dose 11/11/20, MDD, Anxiety, recent hospitalization for L calcaneal Fx/L Foot Cellulitis s/p Vancomycin and Clindamycin presents to [facility] ER for SOB/GRIMALDO for past few days. EKG sinus tachycardi and CTA positive for multilobar BL acute PE. Pt seen/examined. Denies current complaints. Minimal SOB. No other issues. ROS negative unless mentioned. Denies CP.     Pulm: [facility] [place]  Surgery: [facility]  Oncology: Italo Lynne  (19 Nov 2020 06:15)    Above Appreciated  Cardiology consult requested for chest pain. Pt states that her chest pain occurs with deep inspiration, left sided radiating to the back. Pt also c/o bilateral leg pain and "fluttering" sensation in legs. Pt denies anginal symptoms.      Chest Pain  -ECG-ST HR @ 102  -Tropx2- negative  -CT Angio Chest-Bilateral pulmonary emboli as detailed above. Slight interval increase in size of poorly defined left upper lobe opacity measuring 3 x 1.8 cm previously measuring 2.9 x 1.6 cm. likely in keeping with slight progression of malignancy or metastatic disease. Short term pulmonary imaging follow-up in 3-6 month is advised to demonstrate stability. Stable additional left upper lobe opacity measuring 1.7x 0.6 cm centimeters fissure. Trace left pleural effusion or increased left pleural parenchymal thickening  -Chest Pain likely d/t chest malignancy and dx of PE. Pt denies having anginal symptoms and only has chest pain with deep inspiration  -Cont current medication regimen with Lovenox     Phlebitis  -Pt has hx of phlebitis   -Currently c/o leg pain and "fluttering sensation in legs"  -Pedal Pulses +2/+2  -Vascular Consult recommended

## 2020-11-20 NOTE — CONSULT NOTE ADULT - ATTENDING COMMENTS
65F with provoked PE in setting of NSCLC    PE  - mild dyspnea, no respiratory distress, hemodynamically stable  - no acute ischemic EKG changes, normal troponin, no evidence of R heart strain on CT, normal RV function on echo  - chest pain symptoms, per patient, are purely pleuritic with no exertional component  - continue systemic anticoagulation  - tele x24 hours  - no indication for thrombolytics or thrombectomy at this time  - no evidence of DVT, but would benefit from outpatient Vascular surgery evaluation for lower extremity infection  - no additional cardiac testing recommended at this time

## 2020-11-21 PROCEDURE — 99233 SBSQ HOSP IP/OBS HIGH 50: CPT

## 2020-11-21 PROCEDURE — 93970 EXTREMITY STUDY: CPT | Mod: 26

## 2020-11-21 RX ORDER — ALPRAZOLAM 0.25 MG
0.5 TABLET ORAL
Refills: 0 | Status: DISCONTINUED | OUTPATIENT
Start: 2020-11-21 | End: 2020-11-24

## 2020-11-21 RX ORDER — POLYETHYLENE GLYCOL 3350 17 G/17G
17 POWDER, FOR SOLUTION ORAL DAILY
Refills: 0 | Status: DISCONTINUED | OUTPATIENT
Start: 2020-11-21 | End: 2020-11-24

## 2020-11-21 RX ADMIN — MORPHINE SULFATE 15 MILLIGRAM(S): 50 CAPSULE, EXTENDED RELEASE ORAL at 11:39

## 2020-11-21 RX ADMIN — Medication 0.5 MILLIGRAM(S): at 09:58

## 2020-11-21 RX ADMIN — ALBUTEROL 2 PUFF(S): 90 AEROSOL, METERED ORAL at 16:03

## 2020-11-21 RX ADMIN — MORPHINE SULFATE 15 MILLIGRAM(S): 50 CAPSULE, EXTENDED RELEASE ORAL at 01:08

## 2020-11-21 RX ADMIN — ENOXAPARIN SODIUM 70 MILLIGRAM(S): 100 INJECTION SUBCUTANEOUS at 18:11

## 2020-11-21 RX ADMIN — MORPHINE SULFATE 15 MILLIGRAM(S): 50 CAPSULE, EXTENDED RELEASE ORAL at 02:45

## 2020-11-21 RX ADMIN — ALBUTEROL 2 PUFF(S): 90 AEROSOL, METERED ORAL at 09:41

## 2020-11-21 RX ADMIN — Medication 50 MILLIGRAM(S): at 04:06

## 2020-11-21 RX ADMIN — MORPHINE SULFATE 15 MILLIGRAM(S): 50 CAPSULE, EXTENDED RELEASE ORAL at 23:51

## 2020-11-21 RX ADMIN — ALBUTEROL 2 PUFF(S): 90 AEROSOL, METERED ORAL at 02:50

## 2020-11-21 RX ADMIN — Medication 1 MILLIGRAM(S): at 11:39

## 2020-11-21 RX ADMIN — MORPHINE SULFATE 15 MILLIGRAM(S): 50 CAPSULE, EXTENDED RELEASE ORAL at 06:38

## 2020-11-21 RX ADMIN — ALBUTEROL 2 PUFF(S): 90 AEROSOL, METERED ORAL at 21:11

## 2020-11-21 RX ADMIN — ESCITALOPRAM OXALATE 10 MILLIGRAM(S): 10 TABLET, FILM COATED ORAL at 21:26

## 2020-11-21 RX ADMIN — MORPHINE SULFATE 15 MILLIGRAM(S): 50 CAPSULE, EXTENDED RELEASE ORAL at 18:11

## 2020-11-21 RX ADMIN — GABAPENTIN 100 MILLIGRAM(S): 400 CAPSULE ORAL at 18:11

## 2020-11-21 RX ADMIN — GABAPENTIN 100 MILLIGRAM(S): 400 CAPSULE ORAL at 05:01

## 2020-11-21 RX ADMIN — Medication 50 MILLIGRAM(S): at 18:11

## 2020-11-21 RX ADMIN — ENOXAPARIN SODIUM 70 MILLIGRAM(S): 100 INJECTION SUBCUTANEOUS at 04:06

## 2020-11-21 RX ADMIN — MORPHINE SULFATE 15 MILLIGRAM(S): 50 CAPSULE, EXTENDED RELEASE ORAL at 12:09

## 2020-11-21 NOTE — PROGRESS NOTE ADULT - SUBJECTIVE AND OBJECTIVE BOX
[facility] Division of Hospital Medicine    Chief Complaint:  PE/dvt    SUBJECTIVE: reports minimal improvement in SOB, still would get SOB and palpitations' with ambulation or changing    OVERNIGHT EVENTS: none    Patient denies chest pain, SOB, abd pain, N/V, fever, chills, dysuria or any other complaints. All remainder ROS negative.     MEDICATIONS  (STANDING):  ALBUTerol    90 MICROgram(s) HFA Inhaler 2 Puff(s) Inhalation every 6 hours  budesonide 160 MICROgram(s)/formoterol 4.5 MICROgram(s) Inhaler 2 Puff(s) Inhalation two times a day  doxycycline hyclate Capsule 50 milliGRAM(s) Oral every 12 hours  enoxaparin Injectable 70 milliGRAM(s) SubCutaneous every 12 hours  escitalopram 10 milliGRAM(s) Oral at bedtime  folic acid 1 milliGRAM(s) Oral daily  gabapentin 100 milliGRAM(s) Oral two times a day  morphine  IR 15 milliGRAM(s) Oral every 6 hours  polyethylene glycol 3350 17 Gram(s) Oral daily    MEDICATIONS  (PRN):  ALPRAZolam 0.5 milliGRAM(s) Oral daily PRN anxiety  senna 2 Tablet(s) Oral at bedtime PRN Constipation        I&O's Summary    20 Nov 2020 07:01  -  21 Nov 2020 07:00  --------------------------------------------------------  IN: 560 mL / OUT: 1900 mL / NET: -1340 mL    21 Nov 2020 07:01  -  21 Nov 2020 15:04  --------------------------------------------------------  IN: 480 mL / OUT: 350 mL / NET: 130 mL        PHYSICAL EXAM:  Vital Signs Last 24 Hrs  T(C): 36.6 (21 Nov 2020 10:10), Max: 37.1 (20 Nov 2020 20:41)  T(F): 97.8 (21 Nov 2020 10:10), Max: 98.7 (20 Nov 2020 20:41)  HR: 81 (21 Nov 2020 10:10) (81 - 98)  BP: 99/60 (21 Nov 2020 10:10) (98/61 - 116/73)  BP(mean): --  RR: 18 (21 Nov 2020 10:10) (16 - 18)  SpO2: 96% (21 Nov 2020 10:10) (92% - 96%)        CONSTITUTIONAL: NAD, well-developed, well-groomed  ENMT: Moist oral mucosa, no pharyngeal injection or exudates; normal dentition; No JVD  RESPIRATORY: Normal respiratory effort; lungs are clear to auscultation bilaterally  CARDIOVASCULAR: Regular rate and rhythm, normal S1 and S2, no murmur/rub/gallop; No lower extremity edema; Peripheral pulses are 2+ bilaterally  ABDOMEN: Nontender to palpation, normoactive bowel sounds, no rebound/guarding; No hepatosplenomegaly  MUSCLOSKELETAL:  Normal gait; no clubbing or cyanosis of digits; no joint swelling or tenderness to palpation  PSYCH: A+O to person, place, and time; affect appropriate  NEUROLOGY: CN 2-12 are intact and symmetric; no gross sensory deficits; was observed moving all 4 ext against gravity cooperating with exam.   SKIN: some redness on Lateral surface of R foot has improved as per pt, no warm or swelling    LABS:                        8.2    3.93  )-----------( 72       ( 20 Nov 2020 06:56 )             24.7     11-20    136  |  100  |  22.0<H>  ----------------------------<  95  3.5   |  26.0  |  0.93    Ca    9.1      20 Nov 2020 06:56  Phos  3.0     11-20  Mg     1.5     11-20    TPro  6.0<L>  /  Alb  3.3  /  TBili  <0.2<L>  /  DBili  x   /  AST  11  /  ALT  10  /  AlkPhos  79  11-20    PT/INR - ( 20 Nov 2020 06:56 )   PT: 13.1 sec;   INR: 1.14 ratio           CARDIAC MARKERS ( 20 Nov 2020 06:56 )  x     / <0.01 ng/mL / 26 U/L / x     / x              Culture - Urine (collected 19 Nov 2020 08:18)  Source: .Urine Clean Catch (Midstream)  Final Report (20 Nov 2020 13:26):    <10,000 CFU/mL Normal Urogenital Mercedes    Culture - Blood (collected 19 Nov 2020 00:17)  Source: .Blood Blood-Peripheral  Preliminary Report (21 Nov 2020 01:02):    No growth at 48 hours    Culture - Blood (collected 19 Nov 2020 00:16)  Source: .Blood Blood-Peripheral  Preliminary Report (21 Nov 2020 01:02):    No growth at 48 hours      CAPILLARY BLOOD GLUCOSE            RADIOLOGY & ADDITIONAL TESTS:  Results Reviewed:   Imaging Personally Reviewed:  Electrocardiogram Personally Reviewed:

## 2020-11-21 NOTE — PROGRESS NOTE ADULT - ASSESSMENT
65 year old female known patient at [facility], [facility] seeing on behalf of [facility], H/O stage IV NSCLC on Carboplatin + Alimta+ Keytruda last on 11/10 ,recent hospitalization for cellulitis admitted with fever,tachycardia,hypoxia found to have b/l PE.    1) PE - ? provoked in setting of recent hospitalization,immobilization and also patient with h/o stage IV malignancy.  States she has been immobile at home  on Lovenox 1 mg/kg bid    2D echo done    2) NSCLC - on active chemotherapy. CT scan reviewed   even though mention of lung mass getting slightly larger, might be pseudoprogression as patient on immunotherapy.  upon DC Primary oncology team at [facility] to determine further treatment plan.    3) Anemia - in setting of recent chemotherapy,recent infection  check iron studies,B12,folate  monitor closely while patient on full dose AC    4) Mild leukopenia - not neutropenic, WBC stable    5) thrombocytopenia - in setting of recent chemotherapy. monitor while patient on AC.  monitor    6) emperically on doxycycline for cellulitis

## 2020-11-21 NOTE — PROGRESS NOTE ADULT - ASSESSMENT
Mr. Horton is 65 y.o F with pmh significant for stage 4 NSCLC on immunotherapy, COPD on 1 L NC oxygen at home, anxiety, chronic anemia who was recently tx for RLE cellulitis and L Calcaneal presented this time with SOB and chest tightness and was found to have acute b/l PE w/o heart strain.        # Acute PE most likely due to combination of recent hospitalization and underlining malignancy   -CTA Chest multiple BL PE  -no Rv strain  on ECHO and neg Trop  -check BL Venous Duplex Dopplers - negative for DVT    -Therapeutic Lovenox 70mg/kg Q12 for AC for now  - will d/c with Oncologist optimal AC Lovenox vs NOAC    #Hypokalemia and Hypomagnesemia  -replace and recheck in AM    #Leukopenia/Anemia  - somewhat trending down, will cont'e to monitor, w/o sings of any active bleeding    #Stage 4 NSC Lung CA  -CTA shows slight interval progression of Lung CA  -s/p Pemetrexed/Carboplatin/Pembrolizumab last dose 11/11/20  -O2 via NC PRN   - Alecia Schumacher     #LE Foot Cellulitis, L Calcaneal Fx  -nonoperative Fx plan for outpatient ortho F/u  -s/p Vancomycin and outpt Clindamycin on dc for LLE cellulitis now ?RLE Cellulitis- improved   -Would not continue Linezolid due to risk of Serotonin Syndrome with recently increased Lexapro dose   -PCN/Cephalosporin allergy   -Doxycycline 100mg PO Q12 2/5d for now given recent chemo  -Check Procalcitonin negative and CRP mildly elevated    MDD Anxiety  --Lexapro 10mg PO Q24  -Xanax 0.5mg PO PRN bid    Neuropathy  -Neurontin 100mg PO BID    COPD on Home O2  -O2 via NC PRN  -Therapeutic Sub for Breo Ellipta  -Albuterol PRN    # DVT ppx - Levenox as above  #Code - full code  #LOS - most likely will be d/c on Monday if H/H remains stable and assessed by PT/OT

## 2020-11-21 NOTE — PROGRESS NOTE ADULT - SUBJECTIVE AND OBJECTIVE BOX
65 year old female known patient at [facility], [facility] seeing on behalf of [facility], H/O stage IV NSCLC on Carboplatin + Alimta+ Keytruda last on 11/10 ,recent hospitalization for cellulitis admitted with fever,tachycardia,hypoxia found to have b/l PE.    Seen and examined, resting comfortably. on 2l NC, still dyspnic but says breathing has improved. still has some pleuritic pain but overall says feels better, legs also less erthymatous. afebrile.    ROS negative other than HPI    PAST MEDICAL & SURGICAL HISTORY:  Calcaneal fracture    Lung cancer    Lung nodule  multiple nodules pending biopsy    Pneumonia  May 2020 treated outpatient    COPD without exacerbation  no recent hospitalizations    Shoulder pain with history of repair of rotator cuff  2015    Cervical cancer  s/p hysterectomy 2005    H/O total hysterectomy  2005    H/O cyst of breast  removed with biopsy 1980    Osteochondroma of bone  left lower extremity s/p surgery at 15 years of age    Social History:  Denies current etoh/drug/tobacco use (19 Nov 2020 06:15)    FAMILY HISTORY:  FH: COPD (chronic obstructive pulmonary disease)      MEDICATIONS  (STANDING):  ALBUTerol    90 MICROgram(s) HFA Inhaler 2 Puff(s) Inhalation every 6 hours  doxycycline hyclate Capsule 50 milliGRAM(s) Oral every 12 hours  enoxaparin Injectable 70 milliGRAM(s) SubCutaneous every 12 hours  escitalopram 10 milliGRAM(s) Oral at bedtime  folic acid 1 milliGRAM(s) Oral daily  gabapentin 100 milliGRAM(s) Oral two times a day  potassium chloride    Tablet ER 40 milliEquivalent(s) Oral once    MEDICATIONS  (PRN):  ALPRAZolam 0.5 milliGRAM(s) Oral daily PRN anxiety    ICU Vital Signs Last 24 Hrs  T(C): 36.7 (21 Nov 2020 04:53), Max: 37.1 (20 Nov 2020 20:41)  T(F): 98 (21 Nov 2020 04:53), Max: 98.7 (20 Nov 2020 20:41)  HR: 98 (21 Nov 2020 04:53) (88 - 98)  BP: 98/61 (21 Nov 2020 04:53) (98/61 - 116/73)  BP(mean): --  ABP: --  ABP(mean): --  RR: 16 (21 Nov 2020 04:53) (16 - 16)  SpO2: 95% (21 Nov 2020 04:53) (92% - 96%)    gen - Alert and oriented  Heart - S1S2 RRR  Lung - dec BS b/l  Abd- soft, distended  Ext - RLE mild erythma/cellulitis noted               CBC Full  -  ( 20 Nov 2020 06:56 )  WBC Count : 3.93 K/uL  RBC Count : 2.68 M/uL  Hemoglobin : 8.2 g/dL  Hematocrit : 24.7 %  Platelet Count - Automated : 72 K/uL  Mean Cell Volume : 92.2 fl  Mean Cell Hemoglobin : 30.6 pg  Mean Cell Hemoglobin Concentration : 33.2 gm/dL  Auto Neutrophil # : 1.37 K/uL  Auto Lymphocyte # : 2.08 K/uL  Auto Monocyte # : 0.39 K/uL  Auto Eosinophil # : 0.08 K/uL  Auto Basophil # : 0.01 K/uL  Auto Neutrophil % : 34.9 %  Auto Lymphocyte % : 52.9 %  Auto Monocyte % : 9.9 %  Auto Eosinophil % : 2.0 %  Auto Basophil % : 0.3 %    11-20    136  |  100  |  22.0<H>  ----------------------------<  95  3.5   |  26.0  |  0.93    Ca    9.1      20 Nov 2020 06:56  Phos  3.0     11-20  Mg     1.5     11-20    TPro  6.0<L>  /  Alb  3.3  /  TBili  <0.2<L>  /  DBili  x   /  AST  11  /  ALT  10  /  AlkPhos  79  11-20

## 2020-11-22 LAB
ANION GAP SERPL CALC-SCNC: 12 MMOL/L — SIGNIFICANT CHANGE UP (ref 5–17)
BUN SERPL-MCNC: 21 MG/DL — HIGH (ref 8–20)
CALCIUM SERPL-MCNC: 9.5 MG/DL — SIGNIFICANT CHANGE UP (ref 8.6–10.2)
CHLORIDE SERPL-SCNC: 95 MMOL/L — LOW (ref 98–107)
CO2 SERPL-SCNC: 31 MMOL/L — HIGH (ref 22–29)
CREAT SERPL-MCNC: 0.77 MG/DL — SIGNIFICANT CHANGE UP (ref 0.5–1.3)
GLUCOSE SERPL-MCNC: 97 MG/DL — SIGNIFICANT CHANGE UP (ref 70–99)
HCT VFR BLD CALC: 26.7 % — LOW (ref 34.5–45)
HGB BLD-MCNC: 8.9 G/DL — LOW (ref 11.5–15.5)
MCHC RBC-ENTMCNC: 31.3 PG — SIGNIFICANT CHANGE UP (ref 27–34)
MCHC RBC-ENTMCNC: 33.3 GM/DL — SIGNIFICANT CHANGE UP (ref 32–36)
MCV RBC AUTO: 94 FL — SIGNIFICANT CHANGE UP (ref 80–100)
PLATELET # BLD AUTO: 87 K/UL — LOW (ref 150–400)
POTASSIUM SERPL-MCNC: 3.8 MMOL/L — SIGNIFICANT CHANGE UP (ref 3.5–5.3)
POTASSIUM SERPL-SCNC: 3.8 MMOL/L — SIGNIFICANT CHANGE UP (ref 3.5–5.3)
RBC # BLD: 2.84 M/UL — LOW (ref 3.8–5.2)
RBC # FLD: 12.7 % — SIGNIFICANT CHANGE UP (ref 10.3–14.5)
SODIUM SERPL-SCNC: 138 MMOL/L — SIGNIFICANT CHANGE UP (ref 135–145)
WBC # BLD: 3 K/UL — LOW (ref 3.8–10.5)
WBC # FLD AUTO: 3 K/UL — LOW (ref 3.8–10.5)

## 2020-11-22 PROCEDURE — 99233 SBSQ HOSP IP/OBS HIGH 50: CPT

## 2020-11-22 RX ORDER — LACTOBACILLUS ACIDOPHILUS 100MM CELL
1 CAPSULE ORAL
Refills: 0 | Status: DISCONTINUED | OUTPATIENT
Start: 2020-11-22 | End: 2020-11-24

## 2020-11-22 RX ADMIN — ALBUTEROL 2 PUFF(S): 90 AEROSOL, METERED ORAL at 03:37

## 2020-11-22 RX ADMIN — GABAPENTIN 100 MILLIGRAM(S): 400 CAPSULE ORAL at 05:00

## 2020-11-22 RX ADMIN — Medication 1 MILLIGRAM(S): at 07:53

## 2020-11-22 RX ADMIN — ESCITALOPRAM OXALATE 10 MILLIGRAM(S): 10 TABLET, FILM COATED ORAL at 22:02

## 2020-11-22 RX ADMIN — MORPHINE SULFATE 15 MILLIGRAM(S): 50 CAPSULE, EXTENDED RELEASE ORAL at 14:35

## 2020-11-22 RX ADMIN — MORPHINE SULFATE 15 MILLIGRAM(S): 50 CAPSULE, EXTENDED RELEASE ORAL at 13:56

## 2020-11-22 RX ADMIN — Medication 50 MILLIGRAM(S): at 05:00

## 2020-11-22 RX ADMIN — Medication 1 TABLET(S): at 13:48

## 2020-11-22 RX ADMIN — Medication 500 MILLIGRAM(S): at 13:40

## 2020-11-22 RX ADMIN — ENOXAPARIN SODIUM 70 MILLIGRAM(S): 100 INJECTION SUBCUTANEOUS at 17:16

## 2020-11-22 RX ADMIN — MORPHINE SULFATE 15 MILLIGRAM(S): 50 CAPSULE, EXTENDED RELEASE ORAL at 00:51

## 2020-11-22 RX ADMIN — MORPHINE SULFATE 15 MILLIGRAM(S): 50 CAPSULE, EXTENDED RELEASE ORAL at 23:40

## 2020-11-22 RX ADMIN — MORPHINE SULFATE 15 MILLIGRAM(S): 50 CAPSULE, EXTENDED RELEASE ORAL at 07:51

## 2020-11-22 RX ADMIN — MORPHINE SULFATE 15 MILLIGRAM(S): 50 CAPSULE, EXTENDED RELEASE ORAL at 05:00

## 2020-11-22 RX ADMIN — ENOXAPARIN SODIUM 70 MILLIGRAM(S): 100 INJECTION SUBCUTANEOUS at 05:09

## 2020-11-22 RX ADMIN — MORPHINE SULFATE 15 MILLIGRAM(S): 50 CAPSULE, EXTENDED RELEASE ORAL at 06:32

## 2020-11-22 RX ADMIN — GABAPENTIN 100 MILLIGRAM(S): 400 CAPSULE ORAL at 17:16

## 2020-11-22 RX ADMIN — MORPHINE SULFATE 15 MILLIGRAM(S): 50 CAPSULE, EXTENDED RELEASE ORAL at 22:01

## 2020-11-22 RX ADMIN — ALBUTEROL 2 PUFF(S): 90 AEROSOL, METERED ORAL at 15:37

## 2020-11-22 RX ADMIN — Medication 500 MILLIGRAM(S): at 14:35

## 2020-11-22 RX ADMIN — ALBUTEROL 2 PUFF(S): 90 AEROSOL, METERED ORAL at 09:13

## 2020-11-22 RX ADMIN — Medication 0.5 MILLIGRAM(S): at 07:53

## 2020-11-22 RX ADMIN — ALBUTEROL 2 PUFF(S): 90 AEROSOL, METERED ORAL at 21:20

## 2020-11-22 RX ADMIN — Medication 50 MILLIGRAM(S): at 17:16

## 2020-11-22 NOTE — PROGRESS NOTE ADULT - SUBJECTIVE AND OBJECTIVE BOX
65 year old female known patient at [facility], [facility] seeing on behalf of [facility], H/O stage IV NSCLC on Carboplatin + Alimta+ Keytruda last on 11/10 ,recent hospitalization for cellulitis admitted with fever,tachycardia,hypoxia found to have b/l PE.    Seen and examined, resting comfortably. on 2l NC, she says yesterday she had L foot pain at site of trauma, she also continues to have chest tightness and dyspnea on exertion.    ROS negative other than HPI    PAST MEDICAL & SURGICAL HISTORY:  Calcaneal fracture    Lung cancer    Lung nodule  multiple nodules pending biopsy    Pneumonia  May 2020 treated outpatient    COPD without exacerbation  no recent hospitalizations    Shoulder pain with history of repair of rotator cuff  2015    Cervical cancer  s/p hysterectomy 2005    H/O total hysterectomy  2005    H/O cyst of breast  removed with biopsy 1980    Osteochondroma of bone  left lower extremity s/p surgery at 15 years of age    Social History:  Denies current etoh/drug/tobacco use (19 Nov 2020 06:15)    FAMILY HISTORY:  FH: COPD (chronic obstructive pulmonary disease)      MEDICATIONS  (STANDING):  ALBUTerol    90 MICROgram(s) HFA Inhaler 2 Puff(s) Inhalation every 6 hours  doxycycline hyclate Capsule 50 milliGRAM(s) Oral every 12 hours  enoxaparin Injectable 70 milliGRAM(s) SubCutaneous every 12 hours  escitalopram 10 milliGRAM(s) Oral at bedtime  folic acid 1 milliGRAM(s) Oral daily  gabapentin 100 milliGRAM(s) Oral two times a day  potassium chloride    Tablet ER 40 milliEquivalent(s) Oral once    MEDICATIONS  (PRN):  ALPRAZolam 0.5 milliGRAM(s) Oral daily PRN anxiety    ICU Vital Signs Last 24 Hrs  T(C): 36.6 (22 Nov 2020 07:50), Max: 36.7 (21 Nov 2020 15:41)  T(F): 97.8 (22 Nov 2020 07:50), Max: 98 (21 Nov 2020 15:41)  HR: 93 (22 Nov 2020 07:50) (81 - 95)  BP: 97/59 (22 Nov 2020 07:50) (97/59 - 105/66)  BP(mean): --  ABP: --  ABP(mean): --  RR: 18 (22 Nov 2020 07:50) (18 - 18)  SpO2: 98% (22 Nov 2020 07:50) (96% - 98%)      gen - Alert and oriented  Heart - S1S2 RRR  Lung - dec BS b/l  Abd- soft, distended  Ext - RLE mild erythma/cellulitis noted, LLE 1+ edema                        8.9    3.00  )-----------( 87       ( 22 Nov 2020 06:21 )             26.7                11-22    138  |  95<L>  |  21.0<H>  ----------------------------<  97  3.8   |  31.0<H>  |  0.77    Ca    9.5      22 Nov 2020 06:21

## 2020-11-22 NOTE — PROGRESS NOTE ADULT - ASSESSMENT
Mr. Horton is 65 y.o F with pmh significant for stage 4 NSCLC on immunotherapy, COPD on 1 L NC oxygen at home, anxiety, chronic anemia who was recently tx for RLE cellulitis and L Calcaneal presented this time with SOB and chest tightness and was found to have acute b/l PE w/o heart strain.        # L ankle pain, most likely OA vs gout  - she does has extensive family hx of gout  - not on any gout medication at home  - started Naproxen 500 bid for next 3 days  - added Uric acid level    # Acute PE most likely due to combination of recent hospitalization and underlining malignancy   -CTA Chest multiple BL PE  -no Rv strain  on ECHO and neg Trop  -check BL Venous Duplex Dopplers - negative for DVT    -Therapeutic Lovenox 70mg/kg Q12 for AC for now  - most likely will d/c with Lovenox and let primary oncologist decide if she would be candidate for NOAC    #Hypokalemia and Hypomagnesemia  -replace and recheck in AM    #Leukopenia/Anemia  - somewhat trending down, will cont'e to monitor, w/o sings of any active bleeding    #Stage 4 NSC Lung CA  -CTA shows slight interval progression of Lung CA  -s/p Pemetrexed/Carboplatin/Pembrolizumab last dose 11/11/20  -O2 via NC PRN   - Alecia Schumacher     #LE Foot Cellulitis, L Calcaneal Fx  -nonoperative Fx plan for outpatient ortho F/u  -s/p Vancomycin and outpt Clindamycin on dc for LLE cellulitis now ?RLE Cellulitis- improved   -Would not continue Linezolid due to risk of Serotonin Syndrome with recently increased Lexapro dose   -PCN/Cephalosporin allergy   -Doxycycline 100mg PO Q12 2/5d for now given recent chemo  -Check Procalcitonin negative and CRP mildly elevated    MDD Anxiety  --Lexapro 10mg PO Q24  -Xanax 0.5mg PO PRN bid    Neuropathy  -Neurontin 100mg PO BID    COPD on Home O2  -O2 via NC PRN  -Therapeutic Sub for Breo Ellipta  -Albuterol PRN    # DVT ppx - Levenox as above  #Code - full code  #LOS - most likely will be d/c on Monday if H/H remains stable and assessed by PT/OT

## 2020-11-22 NOTE — PROGRESS NOTE ADULT - SUBJECTIVE AND OBJECTIVE BOX
[facility] Division of Hospital Medicine    Chief Complaint:  PE/dvt    SUBJECTIVE: reports minimal improvement in SOB, with L ankle pain and sensitivities even to light touch todya  OVERNIGHT EVENTS: none    Patient denies chest pain, SOB, abd pain, N/V, fever, chills, dysuria or any other complaints. All remainder ROS negative.     MEDICATIONS  (STANDING):  ALBUTerol    90 MICROgram(s) HFA Inhaler 2 Puff(s) Inhalation every 6 hours  budesonide 160 MICROgram(s)/formoterol 4.5 MICROgram(s) Inhaler 2 Puff(s) Inhalation two times a day  doxycycline hyclate Capsule 50 milliGRAM(s) Oral every 12 hours  enoxaparin Injectable 70 milliGRAM(s) SubCutaneous every 12 hours  escitalopram 10 milliGRAM(s) Oral at bedtime  folic acid 1 milliGRAM(s) Oral daily  gabapentin 100 milliGRAM(s) Oral two times a day  lactobacillus acidophilus 1 Tablet(s) Oral two times a day  morphine  IR 15 milliGRAM(s) Oral every 6 hours  naproxen 500 milliGRAM(s) Oral two times a day  polyethylene glycol 3350 17 Gram(s) Oral daily    MEDICATIONS  (PRN):  ALPRAZolam 0.5 milliGRAM(s) Oral two times a day PRN anxiety  senna 2 Tablet(s) Oral at bedtime PRN Constipation      I&O's Summary    20 Nov 2020 07:01  -  21 Nov 2020 07:00  --------------------------------------------------------  IN: 560 mL / OUT: 1900 mL / NET: -1340 mL    21 Nov 2020 07:01  -  21 Nov 2020 15:04  --------------------------------------------------------  IN: 480 mL / OUT: 350 mL / NET: 130 mL      physical exam  Vital Signs Last 24 Hrs  T(C): 36.6 (22 Nov 2020 07:50), Max: 36.7 (21 Nov 2020 15:41)  T(F): 97.8 (22 Nov 2020 07:50), Max: 98 (21 Nov 2020 15:41)  HR: 94 (22 Nov 2020 09:14) (85 - 94)  BP: 97/59 (22 Nov 2020 07:50) (97/59 - 105/66)  BP(mean): --  RR: 18 (22 Nov 2020 07:50) (18 - 18)  SpO2: 96% (22 Nov 2020 09:14) (96% - 98%)        CONSTITUTIONAL: NAD, well-developed, well-groomed  ENMT: Moist oral mucosa, no pharyngeal injection or exudates; normal dentition; No JVD  RESPIRATORY: Normal respiratory effort; lungs are clear to auscultation bilaterally  CARDIOVASCULAR: Regular rate and rhythm, normal S1 and S2, no murmur/rub/gallop; No lower extremity edema; Peripheral pulses are 2+ bilaterally  ABDOMEN: Nontender to palpation, normoactive bowel sounds, no rebound/guarding; No hepatosplenomegaly  MUSCLOSKELETAL:  not able to ambulate, L ankle swelling, but no redness ot warm to touch with good pedal pulses; no clubbing or cyanosis of digits; no other joint swelling or tenderness to palpation  PSYCH: A+O to person, place, and time; affect appropriate  NEUROLOGY: CN 2-12 are intact and symmetric; no gross sensory deficits; was observed moving all 4 ext against gravity cooperating with exam.   SKIN: some redness on Lateral surface of R foot has improved as per pt, no warm or swelling       LABS:                        8.9    3.00  )-----------( 87       ( 22 Nov 2020 06:21 )             26.7     11-22    138  |  95<L>  |  21.0<H>  ----------------------------<  97  3.8   |  31.0<H>  |  0.77    Ca    9.5      22 Nov 2020 06:21                CAPILLARY BLOOD GLUCOSE            RADIOLOGY & ADDITIONAL TESTS:  Results Reviewed:   Imaging Personally Reviewed:  Electrocardiogram Personally Reviewed:

## 2020-11-23 ENCOUNTER — TRANSCRIPTION ENCOUNTER (OUTPATIENT)
Age: 65
End: 2020-11-23

## 2020-11-23 LAB — URATE SERPL-MCNC: 5 MG/DL — SIGNIFICANT CHANGE UP (ref 2.4–5.7)

## 2020-11-23 PROCEDURE — 99232 SBSQ HOSP IP/OBS MODERATE 35: CPT

## 2020-11-23 RX ORDER — ALBUTEROL 90 UG/1
2 AEROSOL, METERED ORAL
Qty: 0 | Refills: 0 | DISCHARGE

## 2020-11-23 RX ADMIN — ENOXAPARIN SODIUM 70 MILLIGRAM(S): 100 INJECTION SUBCUTANEOUS at 04:13

## 2020-11-23 RX ADMIN — MORPHINE SULFATE 15 MILLIGRAM(S): 50 CAPSULE, EXTENDED RELEASE ORAL at 04:13

## 2020-11-23 RX ADMIN — Medication 50 MILLIGRAM(S): at 04:13

## 2020-11-23 RX ADMIN — MORPHINE SULFATE 15 MILLIGRAM(S): 50 CAPSULE, EXTENDED RELEASE ORAL at 10:57

## 2020-11-23 RX ADMIN — GABAPENTIN 100 MILLIGRAM(S): 400 CAPSULE ORAL at 17:12

## 2020-11-23 RX ADMIN — Medication 500 MILLIGRAM(S): at 18:01

## 2020-11-23 RX ADMIN — Medication 50 MILLIGRAM(S): at 17:12

## 2020-11-23 RX ADMIN — Medication 500 MILLIGRAM(S): at 06:21

## 2020-11-23 RX ADMIN — MORPHINE SULFATE 15 MILLIGRAM(S): 50 CAPSULE, EXTENDED RELEASE ORAL at 23:35

## 2020-11-23 RX ADMIN — Medication 1 TABLET(S): at 17:12

## 2020-11-23 RX ADMIN — Medication 0.5 MILLIGRAM(S): at 10:58

## 2020-11-23 RX ADMIN — ENOXAPARIN SODIUM 70 MILLIGRAM(S): 100 INJECTION SUBCUTANEOUS at 22:08

## 2020-11-23 RX ADMIN — ALBUTEROL 2 PUFF(S): 90 AEROSOL, METERED ORAL at 03:34

## 2020-11-23 RX ADMIN — MORPHINE SULFATE 15 MILLIGRAM(S): 50 CAPSULE, EXTENDED RELEASE ORAL at 11:34

## 2020-11-23 RX ADMIN — ESCITALOPRAM OXALATE 10 MILLIGRAM(S): 10 TABLET, FILM COATED ORAL at 22:07

## 2020-11-23 RX ADMIN — MORPHINE SULFATE 15 MILLIGRAM(S): 50 CAPSULE, EXTENDED RELEASE ORAL at 17:12

## 2020-11-23 RX ADMIN — MORPHINE SULFATE 15 MILLIGRAM(S): 50 CAPSULE, EXTENDED RELEASE ORAL at 22:08

## 2020-11-23 RX ADMIN — MORPHINE SULFATE 15 MILLIGRAM(S): 50 CAPSULE, EXTENDED RELEASE ORAL at 05:22

## 2020-11-23 RX ADMIN — GABAPENTIN 100 MILLIGRAM(S): 400 CAPSULE ORAL at 06:21

## 2020-11-23 RX ADMIN — ALBUTEROL 2 PUFF(S): 90 AEROSOL, METERED ORAL at 15:11

## 2020-11-23 RX ADMIN — Medication 500 MILLIGRAM(S): at 06:33

## 2020-11-23 RX ADMIN — Medication 500 MILLIGRAM(S): at 17:12

## 2020-11-23 RX ADMIN — MORPHINE SULFATE 15 MILLIGRAM(S): 50 CAPSULE, EXTENDED RELEASE ORAL at 18:02

## 2020-11-23 RX ADMIN — Medication 1 TABLET(S): at 06:22

## 2020-11-23 RX ADMIN — ALBUTEROL 2 PUFF(S): 90 AEROSOL, METERED ORAL at 20:35

## 2020-11-23 RX ADMIN — Medication 1 MILLIGRAM(S): at 17:12

## 2020-11-23 RX ADMIN — ALBUTEROL 2 PUFF(S): 90 AEROSOL, METERED ORAL at 09:12

## 2020-11-23 NOTE — PHYSICAL THERAPY INITIAL EVALUATION ADULT - GROSSLY INTACT, SENSORY
----- Message from Chriss Perez MD sent at 7/17/2019  8:11 AM CDT -----  Please inform patient that lab does show varicella immunity.  No need for further immunization.  
Pt is informed and verbalizes understanding. Her health maintenance is updated stating varicella immunity.  
Grossly Intact

## 2020-11-23 NOTE — DISCHARGE NOTE PROVIDER - NSDCCPCAREPLAN_GEN_ALL_CORE_FT
PRINCIPAL DISCHARGE DIAGNOSIS  Diagnosis: Pulmonary emboli  Assessment and Plan of Treatment: - we found  you have cloths in your lungs  - we stared you on blood thiners  - please continue to take lovenox injections 70 units twice a day  - please f/u with Dr. Ramirez on 12/01 to discuss with you will remain on this medication or will be switched to oral pill.  - watch out for blood in urine, stool, or trauma adn call your doctor if you have above or  report to nearest ED      SECONDARY DISCHARGE DIAGNOSES  Diagnosis: Lung cancer  Assessment and Plan of Treatment: - f/u iw Dr. Ramirez     PRINCIPAL DISCHARGE DIAGNOSIS  Diagnosis: Pulmonary emboli  Assessment and Plan of Treatment: - we found  you have cloths in your lungs  - we stared you on blood thiners  - please continue to take Eliquis 10 mg twice a day for 7 days and then 5 mg twice a day until you was told to stop by your physician.  - please f/u with Dr. Ramirez on 12/01  - watch out for blood in urine, stool, or trauma adn call your doctor if you have above or  report to nearest ED      SECONDARY DISCHARGE DIAGNOSES  Diagnosis: Lung cancer  Assessment and Plan of Treatment: - f/u [facility] Dr. Ramirez

## 2020-11-23 NOTE — PHYSICAL THERAPY INITIAL EVALUATION ADULT - CRITERIA FOR SKILLED THERAPEUTIC INTERVENTIONS
impairments found/anticipated discharge recommendation/predicted duration of therapy intervention/therapy frequency/rehab potential/functional limitations in following categories

## 2020-11-23 NOTE — PROGRESS NOTE ADULT - SUBJECTIVE AND OBJECTIVE BOX
65 year old female [place] [facility] [place] [facility] seeing on behalf of [facility], H/O stage IV NSCLC on Carboplatin + Alimta+ Keytruda last on 11/10 ,recent hospitalization for cellulitis admitted with fever,tachycardia,hypoxia found to have b/l PE.    resting comfortably. on 1l NC, still complains of pleuritic pain with chest tightness and dyspnea on exertion.    ROS negative other than HPI    PAST MEDICAL & SURGICAL HISTORY:  Calcaneal fracture    Lung cancer    Lung nodule  multiple nodules     Pneumonia  May 2020 treated outpatient    COPD without exacerbation  no recent hospitalizations    Shoulder pain with history of repair of rotator cuff  2015    Cervical cancer  s/p hysterectomy 2005    H/O total hysterectomy  2005    H/O cyst of breast  removed with biopsy 1980    Osteochondroma of bone  left lower extremity s/p surgery at 15 years of age    Social History:  Denies current etoh/drug/tobacco use (19 Nov 2020 06:15)    FAMILY HISTORY:  FH: COPD (chronic obstructive pulmonary disease)    MEDICATIONS  (STANDING):  ALBUTerol    90 MICROgram(s) HFA Inhaler 2 Puff(s) Inhalation every 6 hours  budesonide 160 MICROgram(s)/formoterol 4.5 MICROgram(s) Inhaler 2 Puff(s) Inhalation two times a day  doxycycline hyclate Capsule 50 milliGRAM(s) Oral every 12 hours  enoxaparin Injectable 70 milliGRAM(s) SubCutaneous every 12 hours  escitalopram 10 milliGRAM(s) Oral at bedtime  folic acid 1 milliGRAM(s) Oral daily  gabapentin 100 milliGRAM(s) Oral two times a day  lactobacillus acidophilus 1 Tablet(s) Oral two times a day  morphine  IR 15 milliGRAM(s) Oral every 6 hours  naproxen 500 milliGRAM(s) Oral two times a day  polyethylene glycol 3350 17 Gram(s) Oral daily    MEDICATIONS  (PRN):  ALPRAZolam 0.5 milliGRAM(s) Oral two times a day PRN anxiety  senna 2 Tablet(s) Oral at bedtime PRN Constipation    Vital Signs Last 24 Hrs  T(C): 36.6 (23 Nov 2020 04:08), Max: 36.8 (22 Nov 2020 17:21)  T(F): 97.9 (23 Nov 2020 04:08), Max: 98.2 (22 Nov 2020 17:21)  HR: 97 (23 Nov 2020 04:08) (88 - 102)  BP: 97/59 (23 Nov 2020 04:08) (79/48 - 101/65)  BP(mean): --  RR: 18 (23 Nov 2020 04:08) (18 - 18)  SpO2: 97% (23 Nov 2020 04:08) (92% - 98%)  gen - Alert and oriented  Heart - S1S2 RRR  Lung - dec BS b/l  Abd- soft, distended  Ext - RLE mild erythma/cellulitis noted, LLE 1+ edema             Labs:                          8.9    3.00  )-----------( 87       ( 22 Nov 2020 06:21 )             26.7                11-22    138  |  95<L>  |  21.0<H>  ----------------------------<  97  3.8   |  31.0<H>  |  0.77    Ca    9.5      22 Nov 2020 06:21

## 2020-11-23 NOTE — DISCHARGE NOTE PROVIDER - CARE PROVIDER_API CALL
Dr. Ramirez,   Phone: (   )    -  Fax: (   )    -  Established Patient  Follow Up Time: 2 weeks    Les Schumacher  CRITICAL CARE MEDICINE  [address] [facility], [place] [address]  Phone: [phone]  Fax: [phone]  Established Patient  Follow Up Time: 1 month

## 2020-11-23 NOTE — OCCUPATIONAL THERAPY INITIAL EVALUATION ADULT - GENERAL OBSERVATIONS, REHAB EVAL
pt received sitting in bed and left as found, c/o pain 5/10 and reports is an improvement from yesterday, + +Tele and on 1L NCO2

## 2020-11-23 NOTE — DISCHARGE NOTE PROVIDER - NSDCMRMEDTOKEN_GEN_ALL_CORE_FT
ALPRAZolam 0.5 mg oral tablet: 1 tab(s) orally once a day, As Needed MDD:1 tab  Breo Ellipta 100 mcg-25 mcg/inh inhalation powder: 1 puff(s) inhaled once a day  enoxaparin 80 mg/0.8 mL injectable solution: 1 milligram(s) subcutaneously 2 times a day   folic acid 1 mg oral tablet: 1 tab(s) orally once a day  gabapentin 100 mg oral capsule: 1 cap(s) orally 2 times a day  Lexapro 5 mg oral tablet: 2 tab(s) orally once a day (at bedtime)  Senna: 2 tab(s) orally once a day (at bedtime)  traMADol 50 mg oral tablet: 0.5 tab(s) orally every 6 hours, As needed, Severe Pain (7 - 10) MDD:2 tabs   ALPRAZolam 0.5 mg oral tablet: 1 tab(s) orally once a day, As Needed MDD:1 tab  Breo Ellipta 100 mcg-25 mcg/inh inhalation powder: 1 puff(s) inhaled once a day  Eliquis 5 mg oral tablet: 2 tab(s) orally every 12 hours for next 7 days and then take 1 pill twice a day.  folic acid 1 mg oral tablet: 1 tab(s) orally once a day  gabapentin 100 mg oral capsule: 1 cap(s) orally 2 times a day  Lexapro 5 mg oral tablet: 2 tab(s) orally once a day (at bedtime)  Senna: 2 tab(s) orally once a day (at bedtime)  traMADol 50 mg oral tablet: 1 tab(s) orally 2 times a day, As Needed for pain MDD:100 mg

## 2020-11-23 NOTE — PROGRESS NOTE ADULT - SUBJECTIVE AND OBJECTIVE BOX
[facility] Division of Hospital Medicine    Chief Complaint:  PE/dvt    SUBJECTIVE: same level for SOB, with L ankle pain somewhat better    OVERNIGHT EVENTS: none    Patient denies chest pain, SOB, abd pain, N/V, fever, chills, dysuria or any other complaints. All remainder ROS negative.     MEDICATIONS  (STANDING):  ALBUTerol    90 MICROgram(s) HFA Inhaler 2 Puff(s) Inhalation every 6 hours  budesonide 160 MICROgram(s)/formoterol 4.5 MICROgram(s) Inhaler 2 Puff(s) Inhalation two times a day  doxycycline hyclate Capsule 50 milliGRAM(s) Oral every 12 hours  enoxaparin Injectable 70 milliGRAM(s) SubCutaneous every 12 hours  escitalopram 10 milliGRAM(s) Oral at bedtime  folic acid 1 milliGRAM(s) Oral daily  gabapentin 100 milliGRAM(s) Oral two times a day  lactobacillus acidophilus 1 Tablet(s) Oral two times a day  morphine  IR 15 milliGRAM(s) Oral every 6 hours  naproxen 500 milliGRAM(s) Oral two times a day  polyethylene glycol 3350 17 Gram(s) Oral daily    MEDICATIONS  (PRN):  ALPRAZolam 0.5 milliGRAM(s) Oral two times a day PRN anxiety  senna 2 Tablet(s) Oral at bedtime PRN Constipation    I&O's Summary    22 Nov 2020 07:01  -  23 Nov 2020 07:00  --------------------------------------------------------  IN: 240 mL / OUT: 0 mL / NET: 240 mL    23 Nov 2020 07:01  -  23 Nov 2020 15:02  --------------------------------------------------------  IN: 480 mL / OUT: 0 mL / NET: 480 mL        CONSTITUTIONAL: NAD, well-developed, well-groomed  ENMT: Moist oral mucosa, no pharyngeal injection or exudates; normal dentition; No JVD  Vital Signs Last 24 Hrs  T(C): 36.4 (23 Nov 2020 10:05), Max: 36.8 (22 Nov 2020 17:21)  T(F): 97.5 (23 Nov 2020 10:05), Max: 98.2 (22 Nov 2020 17:21)  HR: 87 (23 Nov 2020 10:05) (83 - 102)  BP: 97/61 (23 Nov 2020 10:05) (97/59 - 101/65)  BP(mean): --  RR: 18 (23 Nov 2020 10:05) (18 - 18)  SpO2: 98% (23 Nov 2020 10:05) (92% - 98%)    RESPIRATORY: Normal respiratory effort; lungs are clear to auscultation bilaterally  CARDIOVASCULAR: Regular rate and rhythm, normal S1 and S2, no murmur/rub/gallop; No lower extremity edema; Peripheral pulses are 2+ bilaterally  ABDOMEN: Nontender to palpation, normoactive bowel sounds, no rebound/guarding; No hepatosplenomegaly  MUSCLOSKELETAL:  not able to ambulate, L ankle swelling, but no redness ot warm to touch with good pedal pulses; no clubbing or cyanosis of digits; no other joint swelling or tenderness to palpation  PSYCH: A+O to person, place, and time; affect appropriate  NEUROLOGY: CN 2-12 are intact and symmetric; no gross sensory deficits; was observed moving all 4 ext against gravity cooperating with exam.   SKIN: some redness on Lateral surface of R foot has improved as per pt, no warm or swelling       LABS:                        8.9    3.00  )-----------( 87       ( 22 Nov 2020 06:21 )             26.7     11-22    138  |  95<L>  |  21.0<H>  ----------------------------<  97  3.8   |  31.0<H>  |  0.77    Ca    9.5      22 Nov 2020 06:21                CAPILLARY BLOOD GLUCOSE            RADIOLOGY & ADDITIONAL TESTS:  Results Reviewed:   Imaging Personally Reviewed:  Electrocardiogram Personally Reviewed:

## 2020-11-23 NOTE — DISCHARGE NOTE PROVIDER - CARE PROVIDERS DIRECT ADDRESSES
Mother independent breastfeeding, reports breast previous baby without problems. Baby at breast, observed deep latch, mother denies pain with latch. Reinforced hunger cues, feeding on demand, feed by 3 hours of last feed & importance of skin to skin. Breastfeeding plan, breastfeed on demand every 2-3 hours.  
,DirectAddress_Unknown,[email or web address]

## 2020-11-23 NOTE — PROGRESS NOTE ADULT - ASSESSMENT
65 year old female known patient at [facility] [place] [place] seeing on behalf of [facility], H/O stage IV NSCLC on Carboplatin + Alimta+ Keytruda last on 11/10 ,recent hospitalization for cellulitis admitted with fever,tachycardia,hypoxia found to have b/l PE.    1) PE - ? provoked in setting of recent hospitalization,immobilization and also patient with h/o stage IV malignancy.  States she has been immobile at home.   on Lovenox 1 mg/kg bid, will discuss with [facility] regarding choice of AC upon discharge. There is now data that supports the possible use of DOACS in patients with stage IV malignancy, but another option is to DC her on lovenox and have her follow up with [facility] next week (she already has appt to undergo another scan at [facility] [place]) , at that time ideal choice of AC can be decided by primary oncologist. Risk of recurrent event is higher in this patient with stage IV malignancy even though this time it may have been a provoked event.  Stay on lovenox for now.    2D echo done - no R heart strain, LE USG done- no DVT    2) NSCLC - on active chemotherapy. CT scan reviewed   even though mention of lung mass getting slightly larger, might be pseudoprogression as patient on immunotherapy.  upon DC Primary oncology team at [facility] [place] to determine further treatment plan.    3) Anemia - in setting of recent chemotherapy,recent infection  monitor closely while patient on full dose AC    4) Mild leukopenia - not neutropenic, WBC stable    5) thrombocytopenia - in setting of recent chemotherapy. monitor while patient on AC.  monitor    6) empirically on doxycycline for cellulitis    PT evaluation and DC planning.    Thank you

## 2020-11-23 NOTE — OCCUPATIONAL THERAPY INITIAL EVALUATION ADULT - ADDITIONAL COMMENTS
as per pt. : resides in private house, with 4 steps to enter (+) rail, (+) chair lift to the second floor, Family available to assist as needed upon D/C home, has commode, shower chair, knee scooter, rw

## 2020-11-23 NOTE — PROGRESS NOTE ADULT - ASSESSMENT
Mr. Horton is 65 y.o F with pmh significant for stage 4 NSCLC on immunotherapy, COPD on 1 L NC oxygen at home, anxiety, chronic anemia who was recently tx for RLE cellulitis and L Calcaneal presented this time with SOB and chest tightness and was found to have acute b/l PE w/o heart strain.        # L ankle pain, most likely OA vs gout, has improved  -uric acid -5.0  - c/w  Naproxen 500 bid for now    # Acute PE most likely due to combination of recent hospitalization and underlining malignancy   -CTA Chest multiple BL PE  -no Rv strain  on ECHO and neg Trop  -check BL Venous Duplex Dopplers - negative for DVT    -Therapeutic Lovenox 70mg/kg Q12 for AC for now  - most likely will d/c with Lovenox and let primary oncologist decide if she would be candidate for NOAC  - she requested to more teaching for lovenox injections    #Hypokalemia and Hypomagnesemia  -replace and recheck in AM    #Leukopenia/Anemia  - somewhat trending down, will cont'e to monitor, w/o sings of any active bleeding    #Stage 4 NSC Lung CA  -CTA shows slight interval progression of Lung CA  -s/p Pemetrexed/Carboplatin/Pembrolizumab last dose 11/11/20  -O2 via NC PRN   - Alecia Schumacher     #LE Foot Cellulitis, L Calcaneal Fx  -nonoperative Fx plan for outpatient ortho F/u  -s/p Vancomycin and outpt Clindamycin on dc for LLE cellulitis now ?RLE Cellulitis- improved   -Would not continue Linezolid due to risk of Serotonin Syndrome with recently increased Lexapro dose   -PCN/Cephalosporin allergy   -Doxycycline 100mg PO Q12 4/5d for now given recent chemo  -Check Procalcitonin negative and CRP mildly elevated    MDD Anxiety  --Lexapro 10mg PO Q24  -Xanax 0.5mg PO PRN bid    Neuropathy  -Neurontin 100mg PO BID    COPD on Home O2  -O2 via NC PRN  -Therapeutic Sub for Breo Ellipta  -Albuterol PRN    # DVT ppx - Levenox as above  #Code - full code  #LOS -will go home wiht PT tomorrow

## 2020-11-23 NOTE — PHYSICAL THERAPY INITIAL EVALUATION ADULT - PERTINENT HX OF CURRENT PROBLEM, REHAB EVAL
pt presents to [facility] due o SOB, manuel PE, recent right LE cellulitis and left calcaneal fracture

## 2020-11-23 NOTE — DISCHARGE NOTE PROVIDER - HOSPITAL COURSE
Mr. Horton is 65 y.o F with pmh significant for stage 4 NSCLC on immunotherapy, COPD on 1 L NC oxygen at home, anxiety, chronic anemia who was recently tx for RLE cellulitis and L Calcaneal presented this time with SOB and chest tightness and was found to have acute b/l PE w/o heart strain. She was started on Lovenox 70 bid and Hemo/Oncology team was consulted and recommended to continue theraputic Lovenox with f/u with longitudinale Oncology Dr. Ramirez at [facility] [place] on  12/01.  BLE Doppler was negative for DVT during this admission.     In regards of chronic medical conditions:    # L ankle pain, most likely OA in setting of recent L Calcaneal Fx  -uric acid -5.0 this admission, pain has improved with Naproxen 500 bid for now    # RLE cellulitis - on presentation she had skin changes consistent with possible L foot cellulits, which improved rapidly on Doxyciclin, she finished 5 days of tx.    #Stage 4 NSC Lung CA - CTA shows slight interval progression of Lung CA, which could be pseudo-progression given she was started on Immunotherapy recently >>   she will f/u with Dr. Ramirez     #MDD Anxiety--Lexapro 10mg PO Q24, -Xanax 0.5mg PO PRN bid    #Neuropathy - eurontin 100mg PO BID    #COPD on Home O2, w/o clinical exacerbation  - c/w Breo Ellipta and albuterol and f/u with Dr. Schumacher         Mr. Horton is 65 y.o F with pmh significant for stage 4 NSCLC on immunotherapy, COPD on 1 L NC oxygen at home, anxiety, chronic anemia who was recently tx for RLE cellulitis and L Calcaneal presented this time with SOB and chest tightness and was found to have acute b/l PE w/o heart strain. She was started on Lovenox 70 bid and then transitioned to Eliquis after discussion with Hemo/Oncology at [facility] Dr. Ramirez. She was discharged in stable condition.     In regards of chronic medical conditions:    # L ankle pain, most likely OA in setting of recent L Calcaneal Fx  -uric acid -5.0 this admission, pain has improved with pain management with NSAID and Opioids.     # RLE cellulitis - on presentation she had skin changes consistent with possible L foot cellulits, which improved rapidly on Doxyciclin, she finished 5 days of tx.    #Stage 4 NSC Lung CA - CTA shows slight interval progression of Lung CA, which could be pseudo-progression given she was started on Immunotherapy recently >>   she will f/u with Dr. Ramirez     #MDD Anxiety--Lexapro 10mg PO Q24, -Xanax 0.5mg PO PRN daily     #Neuropathy - eurontin 100mg PO BID    #COPD on Home O2, w/o clinical exacerbation  - c/w Breo Ellipta and albuterol and f/u with Dr. Schumacher    Vital Signs Last 24 Hrs  T(C): 36.4 (24 Nov 2020 09:51), Max: 36.6 (23 Nov 2020 22:05)  T(F): 97.6 (24 Nov 2020 09:51), Max: 97.8 (23 Nov 2020 22:05)  HR: 89 (24 Nov 2020 09:51) (64 - 90)  BP: 92/57 (24 Nov 2020 09:51) (92/57 - 102/66)  BP(mean): --  RR: 19 (24 Nov 2020 09:51) (18 - 19)  SpO2: 96% (24 Nov 2020 09:51) (96% - 100%)     General: NAD, on 1-2 L NC   ENMT: no conjunctival injection, moist oral mucoses, good dentition   Neck and Lymph: no palpable masses in thyroids, no JVD, no lymphadenopathy   Lungs: good air entry b/l, no wheezing/rails/crackles on auscultation, no stridor  CVS: RRR, no M/R/G, no peripheral edema, palpable pedal pulses +2  Abdomen: soft, not distended  Extremities:  slightly swollen L ankle but with preserved ROM  Skin: warm, dry, no rashes, b/l feet skin discoloration consistent with venous insufficiency.   Neuro: OAX3, didn't noted CN abnormalities during my exam, observed moving all 4 ext against gravity and cooperating with physical exam, no apparent loss of sensation.   Pschyc; appropriate thought process, mood, response

## 2020-11-24 ENCOUNTER — TRANSCRIPTION ENCOUNTER (OUTPATIENT)
Age: 65
End: 2020-11-24

## 2020-11-24 VITALS
DIASTOLIC BLOOD PRESSURE: 61 MMHG | OXYGEN SATURATION: 98 % | SYSTOLIC BLOOD PRESSURE: 91 MMHG | RESPIRATION RATE: 18 BRPM | TEMPERATURE: 98 F | HEART RATE: 75 BPM

## 2020-11-24 LAB
CULTURE RESULTS: SIGNIFICANT CHANGE UP
CULTURE RESULTS: SIGNIFICANT CHANGE UP
HCT VFR BLD CALC: 24.5 % — LOW (ref 34.5–45)
HGB BLD-MCNC: 8.2 G/DL — LOW (ref 11.5–15.5)
MCHC RBC-ENTMCNC: 31.4 PG — SIGNIFICANT CHANGE UP (ref 27–34)
MCHC RBC-ENTMCNC: 33.5 GM/DL — SIGNIFICANT CHANGE UP (ref 32–36)
MCV RBC AUTO: 93.9 FL — SIGNIFICANT CHANGE UP (ref 80–100)
PLATELET # BLD AUTO: 70 K/UL — LOW (ref 150–400)
RBC # BLD: 2.61 M/UL — LOW (ref 3.8–5.2)
RBC # FLD: 12.7 % — SIGNIFICANT CHANGE UP (ref 10.3–14.5)
SPECIMEN SOURCE: SIGNIFICANT CHANGE UP
SPECIMEN SOURCE: SIGNIFICANT CHANGE UP
WBC # BLD: 2.61 K/UL — LOW (ref 3.8–10.5)
WBC # FLD AUTO: 2.61 K/UL — LOW (ref 3.8–10.5)

## 2020-11-24 PROCEDURE — 93970 EXTREMITY STUDY: CPT

## 2020-11-24 PROCEDURE — 83880 ASSAY OF NATRIURETIC PEPTIDE: CPT

## 2020-11-24 PROCEDURE — 71275 CT ANGIOGRAPHY CHEST: CPT

## 2020-11-24 PROCEDURE — 81003 URINALYSIS AUTO W/O SCOPE: CPT

## 2020-11-24 PROCEDURE — 96365 THER/PROPH/DIAG IV INF INIT: CPT | Mod: XU

## 2020-11-24 PROCEDURE — 93306 TTE W/DOPPLER COMPLETE: CPT

## 2020-11-24 PROCEDURE — 87040 BLOOD CULTURE FOR BACTERIA: CPT

## 2020-11-24 PROCEDURE — 80048 BASIC METABOLIC PNL TOTAL CA: CPT

## 2020-11-24 PROCEDURE — 84145 PROCALCITONIN (PCT): CPT

## 2020-11-24 PROCEDURE — 86140 C-REACTIVE PROTEIN: CPT

## 2020-11-24 PROCEDURE — 97116 GAIT TRAINING THERAPY: CPT

## 2020-11-24 PROCEDURE — 36415 COLL VENOUS BLD VENIPUNCTURE: CPT

## 2020-11-24 PROCEDURE — 74177 CT ABD & PELVIS W/CONTRAST: CPT

## 2020-11-24 PROCEDURE — 87086 URINE CULTURE/COLONY COUNT: CPT

## 2020-11-24 PROCEDURE — 85027 COMPLETE CBC AUTOMATED: CPT

## 2020-11-24 PROCEDURE — 0225U NFCT DS DNA&RNA 21 SARSCOV2: CPT

## 2020-11-24 PROCEDURE — 99239 HOSP IP/OBS DSCHRG MGMT >30: CPT

## 2020-11-24 PROCEDURE — 84484 ASSAY OF TROPONIN QUANT: CPT

## 2020-11-24 PROCEDURE — 85610 PROTHROMBIN TIME: CPT

## 2020-11-24 PROCEDURE — 94640 AIRWAY INHALATION TREATMENT: CPT

## 2020-11-24 PROCEDURE — 94760 N-INVAS EAR/PLS OXIMETRY 1: CPT

## 2020-11-24 PROCEDURE — 84100 ASSAY OF PHOSPHORUS: CPT

## 2020-11-24 PROCEDURE — 71045 X-RAY EXAM CHEST 1 VIEW: CPT

## 2020-11-24 PROCEDURE — 80053 COMPREHEN METABOLIC PANEL: CPT

## 2020-11-24 PROCEDURE — 86769 SARS-COV-2 COVID-19 ANTIBODY: CPT

## 2020-11-24 PROCEDURE — 96375 TX/PRO/DX INJ NEW DRUG ADDON: CPT | Mod: XU

## 2020-11-24 PROCEDURE — 83735 ASSAY OF MAGNESIUM: CPT

## 2020-11-24 PROCEDURE — 85730 THROMBOPLASTIN TIME PARTIAL: CPT

## 2020-11-24 PROCEDURE — 99285 EMERGENCY DEPT VISIT HI MDM: CPT | Mod: 25

## 2020-11-24 PROCEDURE — 85025 COMPLETE CBC W/AUTO DIFF WBC: CPT

## 2020-11-24 PROCEDURE — 97163 PT EVAL HIGH COMPLEX 45 MIN: CPT

## 2020-11-24 PROCEDURE — 97110 THERAPEUTIC EXERCISES: CPT

## 2020-11-24 PROCEDURE — 83605 ASSAY OF LACTIC ACID: CPT

## 2020-11-24 PROCEDURE — 82550 ASSAY OF CK (CPK): CPT

## 2020-11-24 PROCEDURE — 96367 TX/PROPH/DG ADDL SEQ IV INF: CPT | Mod: XU

## 2020-11-24 PROCEDURE — 97166 OT EVAL MOD COMPLEX 45 MIN: CPT

## 2020-11-24 PROCEDURE — 93005 ELECTROCARDIOGRAM TRACING: CPT

## 2020-11-24 PROCEDURE — 84550 ASSAY OF BLOOD/URIC ACID: CPT

## 2020-11-24 RX ORDER — APIXABAN 2.5 MG/1
2 TABLET, FILM COATED ORAL
Qty: 120 | Refills: 0
Start: 2020-11-24 | End: 2020-12-23

## 2020-11-24 RX ORDER — ENOXAPARIN SODIUM 100 MG/ML
70 INJECTION SUBCUTANEOUS
Qty: 28 | Refills: 0
Start: 2020-11-24 | End: 2020-12-07

## 2020-11-24 RX ORDER — ENOXAPARIN SODIUM 100 MG/ML
1 INJECTION SUBCUTANEOUS
Qty: 28 | Refills: 0
Start: 2020-11-24 | End: 2020-12-07

## 2020-11-24 RX ORDER — APIXABAN 2.5 MG/1
10 TABLET, FILM COATED ORAL EVERY 12 HOURS
Refills: 0 | Status: DISCONTINUED | OUTPATIENT
Start: 2020-11-24 | End: 2020-11-24

## 2020-11-24 RX ORDER — TRAMADOL HYDROCHLORIDE 50 MG/1
1 TABLET ORAL
Qty: 10 | Refills: 0
Start: 2020-11-24 | End: 2020-11-28

## 2020-11-24 RX ADMIN — GABAPENTIN 100 MILLIGRAM(S): 400 CAPSULE ORAL at 06:04

## 2020-11-24 RX ADMIN — ALBUTEROL 2 PUFF(S): 90 AEROSOL, METERED ORAL at 14:46

## 2020-11-24 RX ADMIN — ALBUTEROL 2 PUFF(S): 90 AEROSOL, METERED ORAL at 04:02

## 2020-11-24 RX ADMIN — ALBUTEROL 2 PUFF(S): 90 AEROSOL, METERED ORAL at 09:26

## 2020-11-24 RX ADMIN — MORPHINE SULFATE 15 MILLIGRAM(S): 50 CAPSULE, EXTENDED RELEASE ORAL at 06:02

## 2020-11-24 RX ADMIN — Medication 500 MILLIGRAM(S): at 06:04

## 2020-11-24 RX ADMIN — Medication 500 MILLIGRAM(S): at 06:06

## 2020-11-24 RX ADMIN — Medication 1 TABLET(S): at 06:04

## 2020-11-24 RX ADMIN — APIXABAN 10 MILLIGRAM(S): 2.5 TABLET, FILM COATED ORAL at 11:26

## 2020-11-24 RX ADMIN — Medication 0.5 MILLIGRAM(S): at 03:23

## 2020-11-24 RX ADMIN — Medication 1 MILLIGRAM(S): at 10:29

## 2020-11-24 RX ADMIN — MORPHINE SULFATE 15 MILLIGRAM(S): 50 CAPSULE, EXTENDED RELEASE ORAL at 10:29

## 2020-11-24 RX ADMIN — Medication 50 MILLIGRAM(S): at 04:41

## 2020-11-24 RX ADMIN — MORPHINE SULFATE 15 MILLIGRAM(S): 50 CAPSULE, EXTENDED RELEASE ORAL at 04:41

## 2020-11-24 NOTE — PROGRESS NOTE ADULT - SUBJECTIVE AND OBJECTIVE BOX
65 year old female known patient at [facility], [facility] seeing on behalf of [facility], H/O stage IV NSCLC on Carboplatin + Alimta+ Keytruda last on 11/10 ,recent hospitalization for cellulitis admitted with fever,tachycardia,hypoxia found to have b/l PE.    INTERVAL HISTORY  resting comfortably. the patient reports mild pleuritic pain. no hemoptysis. tolerating lovenox. no bleeding. cright foot cellulitis improving.   ROS negative other than HPI    PAST MEDICAL & SURGICAL HISTORY:  Calcaneal fracture    Lung cancer    Lung nodule  multiple nodules     Pneumonia  May 2020 treated outpatient    COPD without exacerbation  no recent hospitalizations    Shoulder pain with history of repair of rotator cuff  2015    Cervical cancer  s/p hysterectomy 2005    H/O total hysterectomy  2005    H/O cyst of breast  removed with biopsy 1980    Osteochondroma of bone  left lower extremity s/p surgery at 15 years of age    Social History:  Denies current etoh/drug/tobacco use (19 Nov 2020 06:15)    FAMILY HISTORY:  FH: COPD (chronic obstructive pulmonary disease)    MEDICATIONS  (STANDING):  ALBUTerol    90 MICROgram(s) HFA Inhaler 2 Puff(s) Inhalation every 6 hours  budesonide 160 MICROgram(s)/formoterol 4.5 MICROgram(s) Inhaler 2 Puff(s) Inhalation two times a day  doxycycline hyclate Capsule 50 milliGRAM(s) Oral every 12 hours  enoxaparin Injectable 70 milliGRAM(s) SubCutaneous every 12 hours  escitalopram 10 milliGRAM(s) Oral at bedtime  folic acid 1 milliGRAM(s) Oral daily  gabapentin 100 milliGRAM(s) Oral two times a day  lactobacillus acidophilus 1 Tablet(s) Oral two times a day  morphine  IR 15 milliGRAM(s) Oral every 6 hours  naproxen 500 milliGRAM(s) Oral two times a day  polyethylene glycol 3350 17 Gram(s) Oral daily    Vital Signs Last 24 Hrs  T(C): 36.4 (24 Nov 2020 04:31), Max: 36.6 (23 Nov 2020 22:05)  T(F): 97.6 (24 Nov 2020 04:31), Max: 97.8 (23 Nov 2020 22:05)  HR: 77 (24 Nov 2020 04:31) (64 - 90)  BP: 102/66 (24 Nov 2020 04:31) (96/64 - 102/66)  BP(mean): --  RR: 18 (24 Nov 2020 04:31) (18 - 18)  SpO2: 97% (24 Nov 2020 04:31) (96% - 100%)  gen - Alert and oriented  Heart - S1S2 RRR  Lung - dec BS b/l  Abd- soft, distended  Ext - RLE fading erythma/cellulitis noted, LLE trace edema             Labs:                          8.9    3.00  )-----------( 87       ( 22 Nov 2020 06:21 )             26.7                11-22    138  |  95<L>  |  21.0<H>  ----------------------------<  97  3.8   |  31.0<H>  |  0.77    Ca    9.5      22 Nov 2020 06:21

## 2020-11-24 NOTE — PROGRESS NOTE ADULT - ASSESSMENT
65 year old female known patient at [facility], [facility] seeing on behalf of [facility], H/O stage IV NSCLC on Carboplatin + Alimta+ Keytruda last on 11/10 ,recent hospitalization for cellulitis admitted with fever,tachycardia,hypoxia found to have b/l PE.    1) PE - ? provoked in setting of recent hospitalization,immobilization and also patient with h/o stage IV malignancy.  States she has been immobile at home.   on Lovenox 1 mg/kg bid, patient will discuss with [facility] regarding choice of AC upon discharge. Plan to DC her on lovenox and have her follow up with [facility] next week (she already has appt to undergo another scan at [facility]) , at that time ideal choice of AC can be decided by primary oncologist. Risk of recurrent event is higher in this patient with stage IV malignancy even though this time it may have been a provoked event.      2D echo done - no R heart strain, LE USG done- no DVT    2) NSCLC - on active chemotherapy. CT scan reviewed   even though mention of lung mass getting slightly larger, might be pseudoprogression as patient on immunotherapy.  upon DC Primary oncology team at [facility] to determine further treatment plan.    3) Anemia - in setting of recent chemotherapy,recent infection  monitor closely while patient on full dose AC. will check cbc today    4) Mild leukopenia - not neutropenic, WBC stable    5) thrombocytopenia - in setting of recent chemotherapy. monitor while patient on AC.      6) empirically on doxycycline for cellulitis. last day today. improving

## 2020-11-24 NOTE — DISCHARGE NOTE NURSING/CASE MANAGEMENT/SOCIAL WORK - PATIENT PORTAL LINK FT
You can access the FollowMyHealth Patient Portal offered by [facility] by registering at the following website: [email or web address]. By joining [facility]’s FollowMyHealth portal, you will also be able to view your health information using other applications (apps) compatible with our system.

## 2020-12-12 ENCOUNTER — APPOINTMENT (OUTPATIENT)
Dept: DISASTER EMERGENCY | Facility: CLINIC | Age: 65
End: 2020-12-12

## 2020-12-30 ENCOUNTER — APPOINTMENT (OUTPATIENT)
Dept: DISASTER EMERGENCY | Facility: CLINIC | Age: 65
End: 2020-12-30

## 2020-12-31 LAB — SARS-COV-2 N GENE NPH QL NAA+PROBE: NOT DETECTED

## 2021-01-04 ENCOUNTER — APPOINTMENT (OUTPATIENT)
Dept: PULMONOLOGY | Facility: CLINIC | Age: 66
End: 2021-01-04
Payer: COMMERCIAL

## 2021-01-04 VITALS — SYSTOLIC BLOOD PRESSURE: 120 MMHG | HEART RATE: 120 BPM | DIASTOLIC BLOOD PRESSURE: 78 MMHG | OXYGEN SATURATION: 97 %

## 2021-01-04 VITALS — WEIGHT: 170 LBS | HEIGHT: 63 IN | BODY MASS INDEX: 30.12 KG/M2

## 2021-01-04 DIAGNOSIS — J44.9 CHRONIC OBSTRUCTIVE PULMONARY DISEASE, UNSPECIFIED: ICD-10-CM

## 2021-01-04 DIAGNOSIS — C34.92 MALIGNANT NEOPLASM OF UNSPECIFIED PART OF LEFT BRONCHUS OR LUNG: ICD-10-CM

## 2021-01-04 DIAGNOSIS — I34.0 NONRHEUMATIC MITRAL (VALVE) INSUFFICIENCY: ICD-10-CM

## 2021-01-04 DIAGNOSIS — J84.9 INTERSTITIAL PULMONARY DISEASE, UNSPECIFIED: ICD-10-CM

## 2021-01-04 DIAGNOSIS — I26.99 OTHER PULMONARY EMBOLISM W/OUT ACUTE COR PULMONALE: ICD-10-CM

## 2021-01-04 PROCEDURE — 94727 GAS DIL/WSHOT DETER LNG VOL: CPT

## 2021-01-04 PROCEDURE — 99213 OFFICE O/P EST LOW 20 MIN: CPT | Mod: 25

## 2021-01-04 PROCEDURE — 94010 BREATHING CAPACITY TEST: CPT

## 2021-01-04 PROCEDURE — 99072 ADDL SUPL MATRL&STAF TM PHE: CPT

## 2021-01-04 PROCEDURE — 85018 HEMOGLOBIN: CPT | Mod: QW

## 2021-01-04 PROCEDURE — 94729 DIFFUSING CAPACITY: CPT

## 2021-01-04 RX ORDER — TRAMADOL HYDROCHLORIDE 50 MG/1
50 TABLET, COATED ORAL
Refills: 0 | Status: ACTIVE | COMMUNITY

## 2021-01-04 RX ORDER — ESCITALOPRAM OXALATE 10 MG/1
10 TABLET, FILM COATED ORAL
Refills: 0 | Status: ACTIVE | COMMUNITY

## 2021-01-04 RX ORDER — SENNOSIDES 8.6 MG/1
CAPSULE, GELATIN COATED ORAL
Refills: 0 | Status: ACTIVE | COMMUNITY

## 2021-01-04 RX ORDER — OLANZAPINE 5 MG/1
5 TABLET, FILM COATED ORAL
Refills: 0 | Status: ACTIVE | COMMUNITY

## 2021-01-04 RX ORDER — DEXAMETHASONE 4 MG/1
4 TABLET ORAL
Refills: 0 | Status: ACTIVE | COMMUNITY

## 2021-01-04 RX ORDER — FOLIC ACID 1 MG/1
1 TABLET ORAL
Refills: 0 | Status: ACTIVE | COMMUNITY

## 2021-01-04 RX ORDER — ESCITALOPRAM OXALATE 5 MG/1
TABLET, FILM COATED ORAL
Refills: 0 | Status: DISCONTINUED | COMMUNITY
End: 2021-01-04

## 2021-01-04 RX ORDER — GABAPENTIN 300 MG
300 TABLET ORAL
Refills: 0 | Status: ACTIVE | COMMUNITY

## 2021-01-04 RX ORDER — DOCUSATE SODIUM 100 MG/1
CAPSULE ORAL
Refills: 0 | Status: ACTIVE | COMMUNITY

## 2021-01-04 RX ORDER — APIXABAN 5 MG/1
5 TABLET, FILM COATED ORAL
Refills: 0 | Status: ACTIVE | COMMUNITY

## 2021-01-04 NOTE — REASON FOR VISIT
[Follow-Up] : a follow-up visit [Abnormal CXR/ Chest CT] : an abnormal CXR/ chest CT [Lung Cancer] : lung cancer [COPD] : COPD [ILD] : ILD

## 2021-01-04 NOTE — CONSULT LETTER
[Dear  ___] : Dear  [unfilled], [Consult Letter:] : I had the pleasure of evaluating your patient, [unfilled]. [Please see my note below.] : Please see my note below. [Sincerely,] : Sincerely, [DrCeline  ___] : Dr. CHAPA [FreeTextEntry3] : eLs Schumacher MD FCCP\par Pulmonary/Critical Care/Sleep Medicine\par Department of Internal Medicine\par \par [employer]\par

## 2021-01-04 NOTE — HISTORY OF PRESENT ILLNESS
[Former] : former [>= 30 pack years] : >= 30 pack years [TextBox_4] : 65-year-old female with a history of stage IV adenocarcinoma of the lung complicated by mild interstitial lung disease, as well as chronic pulmonary nodules and PE on 11/19/20 treated at [facility]. Patient has been treated with carboplatin, pemetrexed and pembrolizumab , with some musculoskeletal complaints with the latter and subsequent dc'ed. Pt has completed 4 cycles Carb/pem\par \par Uisng O2 at 1lpm 24/7. No new c/o cough wheeze or sputum. Sats off O2 reported at <88% [ESS] : 0

## 2021-01-04 NOTE — DISCUSSION/SUMMARY
[FreeTextEntry1] : 65-year-old female with history of stage IV adenocarcinoma long, complicated by mild interstitial lung disease as well as recent pulmonary emboli seen today in followup. Presently, patient has no significant decrease in exercise tolerance or worsening of her interstitial component. On chest CT. She's been advised to continue following her oxygen saturations and using her oxygen appropriately to maintain saturations above 88%. She will continue further therapy at [facility].

## 2021-01-27 NOTE — DISCHARGE NOTE PROVIDER - NSDCHHATTENDCERT_GEN_ALL_CORE
Medication refilled per protocol.    
My signature below certifies that the above stated patient is homebound and upon completion of the Face-To-Face encounter, has the need for intermittent skilled nursing, physical therapy and/or speech or occupational therapy services in their home for their current diagnosis as outlined in their initial plan of care. These services will continue to be monitored by myself or another physician.

## 2021-07-12 ENCOUNTER — APPOINTMENT (OUTPATIENT)
Dept: DISASTER EMERGENCY | Facility: CLINIC | Age: 66
End: 2021-07-12

## 2021-07-16 ENCOUNTER — APPOINTMENT (OUTPATIENT)
Dept: PULMONOLOGY | Facility: CLINIC | Age: 66
End: 2021-07-16

## 2021-11-18 NOTE — DISCHARGE NOTE NURSING/CASE MANAGEMENT/SOCIAL WORK - NSTRANSFERBELONGINGSRESP_GEN_A_NUR
yes
Personally reviewed old records.  Personally reviewed labs.  Personally reviewed imaging.  Personally reviewed EKG with sinus tachycardia,  bpm, QTc 456 ms, and possible LA enlargement. No acute ST-T wave changes concerning for acute infarction.                          11.8   6.09  )-----------( 182      ( 17 Nov 2021 22:22 )             36.0       11-17    135  |  100  |  18  ----------------------------<  138<H>  3.2<L>   |  18<L>  |  1.05    Ca    9.1      17 Nov 2021 22:22  Phos  2.8     11-18  Mg     1.5     11-18    TPro  7.1  /  Alb  3.6  /  TBili  0.9  /  DBili  x   /  AST  22  /  ALT  8<L>  /  AlkPhos  86  11-17      CARDIAC MARKERS ( 17 Nov 2021 22:22 )  x     / x     / x     / x     / 1.0 ng/mL        LIVER FUNCTIONS - ( 17 Nov 2021 22:22 )  Alb: 3.6 g/dL / Pro: 7.1 g/dL / ALK PHOS: 86 U/L / ALT: 8 U/L / AST: 22 U/L / GGT: x             PTT - ( 17 Nov 2021 22:22 )  PTT:29.5 sec      < from: Xray Chest 1 View- PORTABLE-Urgent (Xray Chest 1 View- PORTABLE-Urgent .) (11.17.21 @ 23:33) >      INTERPRETATION:  Clear lungs. No pleural effusion.    < end of copied text >

## 2024-11-04 NOTE — ED ADULT NURSE NOTE - NS ED PATIENT SAFETY CONCERN
The access site was successfully closed using a (DEVICE PROGLIDE PRCLS 6FR SUT MEDIATE KNOT PUSH [facility]SR [facility] - [id number]) closure device. No